# Patient Record
Sex: MALE | Race: WHITE | Employment: OTHER | URBAN - NONMETROPOLITAN AREA
[De-identification: names, ages, dates, MRNs, and addresses within clinical notes are randomized per-mention and may not be internally consistent; named-entity substitution may affect disease eponyms.]

---

## 2016-12-02 LAB
CHOLESTEROL, TOTAL: 140 MG/DL
CHOLESTEROL/HDL RATIO: 3.7
HDLC SERPL-MCNC: 38 MG/DL (ref 35–70)
LDL CHOLESTEROL CALCULATED: 46 MG/DL (ref 0–160)
TRIGL SERPL-MCNC: 281 MG/DL
VLDLC SERPL CALC-MCNC: 56 MG/DL

## 2019-06-05 LAB
BASOPHILS ABSOLUTE: 0 /ΜL
BASOPHILS RELATIVE PERCENT: 0.3 %
EOSINOPHILS ABSOLUTE: 0.1 /ΜL
EOSINOPHILS RELATIVE PERCENT: 1.2 %
HCT VFR BLD CALC: 38 % (ref 41–53)
HEMOGLOBIN: 12.5 G/DL (ref 13.5–17.5)
LYMPHOCYTES ABSOLUTE: 1 /ΜL
LYMPHOCYTES RELATIVE PERCENT: 10.1 %
MCH RBC QN AUTO: 30.1 PG
MCHC RBC AUTO-ENTMCNC: 32.9 G/DL
MCV RBC AUTO: 92 FL
MONOCYTES ABSOLUTE: 0.8 /ΜL
MONOCYTES RELATIVE PERCENT: 7.6 %
NEUTROPHILS ABSOLUTE: 8.1 /ΜL
NEUTROPHILS RELATIVE PERCENT: 80.8 %
PDW BLD-RTO: 13.2 %
PLATELET # BLD: 248 K/ΜL
PMV BLD AUTO: 8 FL
RBC # BLD: 4.15 10^6/ΜL
WBC # BLD: 10.1 10^3/ML

## 2019-08-05 LAB
AVERAGE GLUCOSE: 160
BUN BLDV-MCNC: 22 MG/DL
CALCIUM SERPL-MCNC: 9.8 MG/DL
CHLORIDE BLD-SCNC: 104 MMOL/L
CO2: 26 MMOL/L
CREAT SERPL-MCNC: 0.94 MG/DL
CREATININE URINE POCT: 16.53
GFR CALCULATED: >60
GLUCOSE BLD-MCNC: 133 MG/DL
HBA1C MFR BLD: 7.2 %
MICROALBUMIN/CREAT 24H UR: <0.7 MG/G{CREAT}
MICROALBUMIN/CREAT UR-RTO: <12.1
POTASSIUM SERPL-SCNC: 4.2 MMOL/L
SODIUM BLD-SCNC: 141 MMOL/L

## 2019-09-03 ENCOUNTER — OFFICE VISIT (OUTPATIENT)
Dept: PRIMARY CARE CLINIC | Age: 64
End: 2019-09-03
Payer: COMMERCIAL

## 2019-09-03 VITALS
SYSTOLIC BLOOD PRESSURE: 112 MMHG | RESPIRATION RATE: 16 BRPM | DIASTOLIC BLOOD PRESSURE: 72 MMHG | BODY MASS INDEX: 36.03 KG/M2 | HEIGHT: 72 IN | OXYGEN SATURATION: 97 % | WEIGHT: 266 LBS | HEART RATE: 95 BPM

## 2019-09-03 DIAGNOSIS — Z79.4 TYPE 2 DIABETES MELLITUS WITH DIABETIC POLYNEUROPATHY, WITH LONG-TERM CURRENT USE OF INSULIN (HCC): Primary | ICD-10-CM

## 2019-09-03 DIAGNOSIS — I10 ESSENTIAL HYPERTENSION: ICD-10-CM

## 2019-09-03 DIAGNOSIS — Z23 ENCOUNTER FOR IMMUNIZATION: ICD-10-CM

## 2019-09-03 DIAGNOSIS — E11.42 TYPE 2 DIABETES MELLITUS WITH DIABETIC POLYNEUROPATHY, WITH LONG-TERM CURRENT USE OF INSULIN (HCC): Primary | ICD-10-CM

## 2019-09-03 DIAGNOSIS — G47.33 OBSTRUCTIVE SLEEP APNEA: ICD-10-CM

## 2019-09-03 DIAGNOSIS — R39.14 BENIGN PROSTATIC HYPERPLASIA WITH INCOMPLETE BLADDER EMPTYING: ICD-10-CM

## 2019-09-03 DIAGNOSIS — K21.9 GASTROESOPHAGEAL REFLUX DISEASE WITHOUT ESOPHAGITIS: ICD-10-CM

## 2019-09-03 DIAGNOSIS — N40.1 BENIGN PROSTATIC HYPERPLASIA WITH INCOMPLETE BLADDER EMPTYING: ICD-10-CM

## 2019-09-03 PROCEDURE — 90653 IIV ADJUVANT VACCINE IM: CPT | Performed by: NURSE PRACTITIONER

## 2019-09-03 PROCEDURE — 99204 OFFICE O/P NEW MOD 45 MIN: CPT | Performed by: NURSE PRACTITIONER

## 2019-09-03 PROCEDURE — 90670 PCV13 VACCINE IM: CPT | Performed by: NURSE PRACTITIONER

## 2019-09-03 PROCEDURE — G0008 ADMIN INFLUENZA VIRUS VAC: HCPCS | Performed by: NURSE PRACTITIONER

## 2019-09-03 PROCEDURE — G0009 ADMIN PNEUMOCOCCAL VACCINE: HCPCS | Performed by: NURSE PRACTITIONER

## 2019-09-03 RX ORDER — OMEPRAZOLE 40 MG/1
40 CAPSULE, DELAYED RELEASE ORAL DAILY
COMMUNITY
End: 2020-01-03 | Stop reason: SDUPTHER

## 2019-09-03 RX ORDER — TAMSULOSIN HYDROCHLORIDE 0.4 MG/1
0.4 CAPSULE ORAL DAILY
COMMUNITY
End: 2019-12-24 | Stop reason: SDUPTHER

## 2019-09-03 RX ORDER — HYOSCYAMINE SULFATE 0.125 MG
125 TABLET ORAL EVERY 6 HOURS PRN
COMMUNITY
End: 2019-12-10 | Stop reason: ALTCHOICE

## 2019-09-03 RX ORDER — FLUTICASONE PROPIONATE 50 MCG
1 SPRAY, SUSPENSION (ML) NASAL DAILY
COMMUNITY
End: 2022-02-08

## 2019-09-03 RX ORDER — TADALAFIL 20 MG/1
20 TABLET ORAL PRN
COMMUNITY
End: 2021-10-18

## 2019-09-03 RX ORDER — FINASTERIDE 5 MG/1
5 TABLET, FILM COATED ORAL DAILY
COMMUNITY
End: 2019-12-30 | Stop reason: SDUPTHER

## 2019-09-03 RX ORDER — SIMVASTATIN 20 MG
20 TABLET ORAL NIGHTLY
COMMUNITY
End: 2020-01-03 | Stop reason: SDUPTHER

## 2019-09-03 RX ORDER — LISINOPRIL 20 MG/1
20 TABLET ORAL DAILY
COMMUNITY
End: 2019-10-23 | Stop reason: SDUPTHER

## 2019-09-03 SDOH — HEALTH STABILITY: MENTAL HEALTH: HOW MANY STANDARD DRINKS CONTAINING ALCOHOL DO YOU HAVE ON A TYPICAL DAY?: 1 OR 2

## 2019-09-03 SDOH — HEALTH STABILITY: MENTAL HEALTH: HOW OFTEN DO YOU HAVE A DRINK CONTAINING ALCOHOL?: 2-4 TIMES A MONTH

## 2019-09-03 ASSESSMENT — ENCOUNTER SYMPTOMS
CONSTIPATION: 0
BACK PAIN: 0
DIARRHEA: 0
NAUSEA: 0
SHORTNESS OF BREATH: 0
EYE ITCHING: 0
BLURRED VISION: 0
COUGH: 0
EYE DISCHARGE: 0

## 2019-09-03 ASSESSMENT — PATIENT HEALTH QUESTIONNAIRE - PHQ9
2. FEELING DOWN, DEPRESSED OR HOPELESS: 0
SUM OF ALL RESPONSES TO PHQ QUESTIONS 1-9: 0
SUM OF ALL RESPONSES TO PHQ9 QUESTIONS 1 & 2: 0
1. LITTLE INTEREST OR PLEASURE IN DOING THINGS: 0
SUM OF ALL RESPONSES TO PHQ QUESTIONS 1-9: 0

## 2019-09-03 NOTE — PROGRESS NOTES
[facility]  [address] [place] [id number]  Dept: [phone]    Be Harrington is a 71 y.o. male who presents today as an new patient for his medical conditionsas noted below. Chief Complaint   Patient presents with    New Patient    Diabetes    Hypertension       HPI:     Diabetes   He presents for his follow-up diabetic visit. He has type 2 diabetes mellitus. Pertinent negatives for hypoglycemia include no confusion, dizziness, headaches, hunger or nervousness/anxiousness. Associated symptoms include chest pain (states he has had some treadmill test and they have been OK), fatigue (CPAP helps) and foot paresthesias. Pertinent negatives for diabetes include no blurred vision, no polydipsia, no polyphagia, no polyuria and no weight loss. Symptoms are stable. Risk factors for coronary artery disease include dyslipidemia, diabetes mellitus, male sex, obesity and hypertension. Current diabetic treatment includes intensive insulin program and oral agent (monotherapy). He is compliant with treatment all of the time. His weight is stable (Lost 25 lb after losing wife. Now it has been staying steady). He is following a generally healthy diet. He participates in exercise intermittently (it was daily before he got sick earlier in the year). There is no change in his home blood glucose trend. His breakfast blood glucose range is generally 130-140 mg/dl. His lunch blood glucose range is generally 110-130 mg/dl. His bedtime blood glucose range is generally 110-130 mg/dl. (Checks blood sugars periodically ) An ACE inhibitor/angiotensin II receptor blocker is being taken. Blood pressure when checked at home has always been good but then blood pressure cuff broke.  States it usually runs normal    No concerns - He did bang up right shin on care - abrasion healing    LDL Calculated (mg/dL)   Date Value   12/02/2016 46       (goal LDL Hepatitis C screen  1955    HIV screen  03/14/1969    Shingles Vaccine (1 of 2) 03/14/2004    Annual Wellness Visit (AWV)  03/14/2017    Lipid screen  12/02/2017    Diabetic retinal exam  03/28/2019    A1C test (Diabetic or Prediabetic)  08/05/2020    Diabetic microalbuminuria test  08/05/2020    Potassium monitoring  08/05/2020    Creatinine monitoring  08/05/2020    Diabetic foot exam  09/03/2020    Pneumococcal 65+ years Vaccine (2 of 2 - PPSV23) 09/03/2020    DTaP/Tdap/Td vaccine (2 - Td) 10/08/2027    Colon cancer screen colonoscopy  10/17/2028    Flu vaccine  Completed       Subjective:     Review of Systems   Constitutional: Positive for fatigue (CPAP helps). Negative for weight loss. HENT: Negative for congestion, ear pain and nosebleeds. Eyes: Negative for blurred vision, discharge and itching. Respiratory: Negative for cough and shortness of breath. Cardiovascular: Positive for chest pain (states he has had some treadmill test and they have been OK). Negative for palpitations and leg swelling. Gastrointestinal: Negative for constipation, diarrhea and nausea. Endocrine: Negative for polydipsia, polyphagia and polyuria. Genitourinary: Negative for difficulty urinating and dysuria. Musculoskeletal: Negative for back pain and gait problem. Skin: Negative for rash and wound. Neurological: Negative for dizziness, light-headedness (when cough) and headaches. Psychiatric/Behavioral: Negative for confusion and sleep disturbance. The patient is not nervous/anxious. Objective:     /72   Pulse 95   Resp 16   Ht 6' (1.829 m)   Wt 266 lb (120.7 kg)   SpO2 97%   BMI 36.08 kg/m²   Physical Exam   Constitutional: He is oriented to person, place, and time. He appears well-developed and well-nourished. HENT:   Head: Normocephalic and atraumatic.    Right Ear: External ear normal.   Left Ear: External ear normal.   Eyes: Pupils are equal, round, and reactive to light. EOM are normal.   Neck: Normal range of motion. Neck supple. No thyromegaly present. Cardiovascular: Normal rate and regular rhythm. No murmur heard. Pulses:       Dorsalis pedis pulses are 1+ on the right side, and 0 on the left side. Posterior tibial pulses are 0 on the right side, and 0 on the left side. No carotid bruit   Pulmonary/Chest: Effort normal and breath sounds normal.   Abdominal: Soft. Bowel sounds are normal.   Musculoskeletal: Normal range of motion. He exhibits no edema. Feet:   Right Foot:   Protective Sensation: 10 sites tested. 1 site sensed. Skin Integrity: Negative for skin breakdown. Left Foot:   Protective Sensation: 10 sites tested. 0 sites sensed. Skin Integrity: Negative for skin breakdown. Neurological: He is alert and oriented to person, place, and time. No cranial nerve deficit. Skin: Skin is warm and dry. Capillary refill takes less than 2 seconds. No erythema. Psychiatric: He has a normal mood and affect. His behavior is normal. Thought content normal.   Nursing note and vitals reviewed. Assessment:       Diagnosis Orders   1. Type 2 diabetes mellitus with diabetic polyneuropathy, with long-term current use of insulin (HCC)  [facility] DIABETES FOOT EXAM   2. Essential hypertension     3. Benign prostatic hyperplasia with incomplete bladder emptying     4. Gastroesophageal reflux disease without esophagitis     5. Encounter for immunization  INFLUENZA, TRIV, INACTIVATED, SUBUNIT, ADJUVANTED, 65 YRS AND OLDER, IM, PREFILL SYR, 0.5ML (FLUAD TRIV)    PREVNAR 13 IM (Pneumococcal conjugate vaccine 13-valent)   6. Obstructive sleep apnea          Plan:    Prevnar 13 and High dose flu vaccine today  Review medical records for colonoscopy and lab work      Return in about 3 months (around 12/3/2019) for diabetes.     Orders Placed This Encounter   Procedures    INFLUENZA, TRIV, INACTIVATED, SUBUNIT, ADJUVANTED, 65 YRS AND OLDER, IM, PREFILL SYR, 0.5ML (FLUAD

## 2019-09-09 ENCOUNTER — OFFICE VISIT (OUTPATIENT)
Dept: PRIMARY CARE CLINIC | Age: 64
End: 2019-09-09
Payer: COMMERCIAL

## 2019-09-09 VITALS
OXYGEN SATURATION: 97 % | DIASTOLIC BLOOD PRESSURE: 78 MMHG | SYSTOLIC BLOOD PRESSURE: 124 MMHG | HEART RATE: 118 BPM | TEMPERATURE: 98.3 F

## 2019-09-09 DIAGNOSIS — J02.9 PHARYNGITIS, UNSPECIFIED ETIOLOGY: Primary | ICD-10-CM

## 2019-09-09 PROCEDURE — 99213 OFFICE O/P EST LOW 20 MIN: CPT | Performed by: NURSE PRACTITIONER

## 2019-09-09 ASSESSMENT — ENCOUNTER SYMPTOMS
SORE THROAT: 1
NAUSEA: 0
CONSTIPATION: 0
EYE DISCHARGE: 0
BACK PAIN: 0
TROUBLE SWALLOWING: 1
SHORTNESS OF BREATH: 0
DIARRHEA: 0
EYE ITCHING: 0
COUGH: 0

## 2019-09-18 ENCOUNTER — OFFICE VISIT (OUTPATIENT)
Dept: PRIMARY CARE CLINIC | Age: 64
End: 2019-09-18
Payer: COMMERCIAL

## 2019-09-18 VITALS
HEART RATE: 100 BPM | WEIGHT: 265.9 LBS | TEMPERATURE: 98.5 F | RESPIRATION RATE: 16 BRPM | BODY MASS INDEX: 36.01 KG/M2 | DIASTOLIC BLOOD PRESSURE: 74 MMHG | OXYGEN SATURATION: 98 % | HEIGHT: 72 IN | SYSTOLIC BLOOD PRESSURE: 122 MMHG

## 2019-09-18 DIAGNOSIS — B96.89 ACUTE BACTERIAL PHARYNGITIS: Primary | ICD-10-CM

## 2019-09-18 DIAGNOSIS — J02.8 ACUTE BACTERIAL PHARYNGITIS: Primary | ICD-10-CM

## 2019-09-18 PROCEDURE — 99213 OFFICE O/P EST LOW 20 MIN: CPT | Performed by: NURSE PRACTITIONER

## 2019-09-18 RX ORDER — AMOXICILLIN 500 MG/1
500 CAPSULE ORAL 3 TIMES DAILY
Qty: 30 CAPSULE | Refills: 0 | Status: SHIPPED | OUTPATIENT
Start: 2019-09-18 | End: 2019-12-10 | Stop reason: ALTCHOICE

## 2019-09-18 ASSESSMENT — ENCOUNTER SYMPTOMS
CHANGE IN BOWEL HABIT: 0
NAUSEA: 1
COUGH: 1
EYE ITCHING: 0
BACK PAIN: 0
SHORTNESS OF BREATH: 0
SORE THROAT: 1
ABDOMINAL PAIN: 0
SWOLLEN GLANDS: 0

## 2019-09-18 NOTE — PATIENT INSTRUCTIONS
Patient Education        Sore Throat: Care Instructions  Your Care Instructions    Infection by bacteria or a virus causes most sore throats. Cigarette smoke, dry air, air pollution, allergies, and yelling can also cause a sore throat. Sore throats can be painful and annoying. Fortunately, most sore throats go away on their own. If you have a bacterial infection, your doctor may prescribe antibiotics. Follow-up care is a key part of your treatment and safety. Be sure to make and go to all appointments, and call your doctor if you are having problems. It's also a good idea to know your test results and keep a list of the medicines you take. How can you care for yourself at home? · If your doctor prescribed antibiotics, take them as directed. Do not stop taking them just because you feel better. You need to take the full course of antibiotics. · Gargle with warm salt water once an hour to help reduce swelling and relieve discomfort. Use 1 teaspoon of salt mixed in 1 cup of warm water. · Take an over-the-counter pain medicine, such as acetaminophen (Tylenol), ibuprofen (Advil, Motrin), or naproxen (Aleve). Read and follow all instructions on the label. · Be careful when taking over-the-counter cold or flu medicines and Tylenol at the same time. Many of these medicines have acetaminophen, which is Tylenol. Read the labels to make sure that you are not taking more than the recommended dose. Too much acetaminophen (Tylenol) can be harmful. · Drink plenty of fluids. Fluids may help soothe an irritated throat. Hot fluids, such as tea or soup, may help decrease throat pain. · Use over-the-counter throat lozenges to soothe pain. Regular cough drops or hard candy may also help. These should not be given to young children because of the risk of choking. · Do not smoke or allow others to smoke around you. If you need help quitting, talk to your doctor about stop-smoking programs and medicines.  These can increase your chances of quitting for good. · Use a vaporizer or humidifier to add moisture to your bedroom. Follow the directions for cleaning the machine. When should you call for help? Call your doctor now or seek immediate medical care if:    · You have new or worse trouble swallowing.     · Your sore throat gets much worse on one side.    Watch closely for changes in your health, and be sure to contact your doctor if you do not get better as expected. Where can you learn more? Go to https://Squrl.[employer]. org and sign in to your [employer] account. Enter V737 in the [facility] box to learn more about \"Sore Throat: Care Instructions. \"     If you do not have an account, please click on the \"Sign Up Now\" link. Current as of: October 21, 2018  Content Version: 12.1  © 7499-2851 Healthwise, Incorporated. Care instructions adapted under license by [facility] ([facility]). If you have questions about a medical condition or this instruction, always ask your healthcare professional. [place] [address] any warranty or liability for your use of this information.

## 2019-09-18 NOTE — PROGRESS NOTES
congestion, nosebleeds and postnasal drip. Eyes: Negative for itching. Respiratory: Positive for cough. Negative for shortness of breath. Cardiovascular: Negative for chest pain. Gastrointestinal: Positive for nausea. Negative for abdominal pain and change in bowel habit. Genitourinary: Negative for difficulty urinating and dysuria. Musculoskeletal: Negative for arthralgias and back pain. Skin: Negative for rash and wound. Neurological: Negative for weakness and headaches. Psychiatric/Behavioral: Negative for dysphoric mood and sleep disturbance. The patient is not nervous/anxious. Objective:     /74   Pulse 100   Temp 98.5 °F (36.9 °C)   Resp 16   Ht 6' (1.829 m)   Wt 265 lb 14.4 oz (120.6 kg)   SpO2 98%   BMI 36.06 kg/m²   Physical Exam   Constitutional: He is oriented to person, place, and time. He appears well-developed and well-nourished. HENT:   Head: Normocephalic and atraumatic. Right Ear: Tympanic membrane, external ear and ear canal normal.   Left Ear: Tympanic membrane, external ear and ear canal normal.   Nose: Nose normal. Right sinus exhibits no maxillary sinus tenderness and no frontal sinus tenderness. Left sinus exhibits no maxillary sinus tenderness and no frontal sinus tenderness. Mouth/Throat: Uvula is midline. Posterior oropharyngeal edema and posterior oropharyngeal erythema present. Eyes: Pupils are equal, round, and reactive to light. Conjunctivae and EOM are normal.   Neck: Normal range of motion. Neck supple. No thyromegaly present. Cardiovascular: Normal rate, regular rhythm and normal heart sounds. No murmur heard. Pulmonary/Chest: Effort normal and breath sounds normal.   Abdominal: Soft. Bowel sounds are normal.   Neurological: He is alert and oriented to person, place, and time. No cranial nerve deficit. Skin: Skin is warm and dry. Capillary refill takes less than 2 seconds. Psychiatric: He has a normal mood and affect.  His behavior is normal. Thought content normal.   Nursing note and vitals reviewed. Assessment:       Diagnosis Orders   1. Acute bacterial pharyngitis  amoxicillin (AMOXIL) 500 MG capsule        Plan:    Amoxicillin 500 mg 3x/day  Gargle with salt water PRN    Return if symptoms worsen or fail to improve. No orders of the defined types were placed in this encounter. Orders Placed This Encounter   Medications    amoxicillin (AMOXIL) 500 MG capsule     Sig: Take 1 capsule by mouth 3 times daily     Dispense:  30 capsule     Refill:  0       Patient given educationalmaterials - see patient instructions. Discussed use, benefit, and side effectsof prescribed medications. All patient questions answered. Pt voiced understanding. Reviewed health maintenance. Instructed to continue current medications, diet andexercise. Patient agreed with treatment plan. Follow up as directed.      Electronicallysigned by KADIE Albarran CNP on 9/18/2019 at 11:42 AM

## 2019-10-24 RX ORDER — LISINOPRIL 20 MG/1
20 TABLET ORAL DAILY
Qty: 90 TABLET | Refills: 3 | Status: SHIPPED | OUTPATIENT
Start: 2019-10-24 | End: 2020-11-10 | Stop reason: SDUPTHER

## 2019-11-18 ENCOUNTER — TELEPHONE (OUTPATIENT)
Dept: PRIMARY CARE CLINIC | Age: 64
End: 2019-11-18

## 2019-12-10 ENCOUNTER — OFFICE VISIT (OUTPATIENT)
Dept: PRIMARY CARE CLINIC | Age: 64
End: 2019-12-10
Payer: COMMERCIAL

## 2019-12-10 VITALS
OXYGEN SATURATION: 96 % | SYSTOLIC BLOOD PRESSURE: 116 MMHG | RESPIRATION RATE: 16 BRPM | DIASTOLIC BLOOD PRESSURE: 64 MMHG | WEIGHT: 260.2 LBS | HEART RATE: 111 BPM | BODY MASS INDEX: 35.24 KG/M2 | HEIGHT: 72 IN

## 2019-12-10 DIAGNOSIS — Z11.4 SCREENING FOR HIV WITHOUT PRESENCE OF RISK FACTORS: ICD-10-CM

## 2019-12-10 DIAGNOSIS — Z00.00 ROUTINE GENERAL MEDICAL EXAMINATION AT A HEALTH CARE FACILITY: ICD-10-CM

## 2019-12-10 DIAGNOSIS — E78.5 HYPERLIPIDEMIA, UNSPECIFIED HYPERLIPIDEMIA TYPE: ICD-10-CM

## 2019-12-10 DIAGNOSIS — Z79.4 TYPE 2 DIABETES MELLITUS WITH DIABETIC POLYNEUROPATHY, WITH LONG-TERM CURRENT USE OF INSULIN (HCC): Primary | ICD-10-CM

## 2019-12-10 DIAGNOSIS — Z72.89 OTHER PROBLEMS RELATED TO LIFESTYLE: ICD-10-CM

## 2019-12-10 DIAGNOSIS — Z13.220 SCREENING FOR HYPERLIPIDEMIA: ICD-10-CM

## 2019-12-10 DIAGNOSIS — E11.42 TYPE 2 DIABETES MELLITUS WITH DIABETIC POLYNEUROPATHY, WITH LONG-TERM CURRENT USE OF INSULIN (HCC): Primary | ICD-10-CM

## 2019-12-10 LAB — HBA1C MFR BLD: 7.1 %

## 2019-12-10 PROCEDURE — 83036 HEMOGLOBIN GLYCOSYLATED A1C: CPT | Performed by: NURSE PRACTITIONER

## 2019-12-10 PROCEDURE — G0402 INITIAL PREVENTIVE EXAM: HCPCS | Performed by: NURSE PRACTITIONER

## 2019-12-10 RX ORDER — TORSEMIDE 20 MG/1
20 TABLET ORAL DAILY
COMMUNITY
End: 2019-12-24 | Stop reason: SDUPTHER

## 2019-12-10 RX ORDER — L-METHYLFOLATE-ALGAE-VIT B12-B6 CAP 3-90.314-2-35 MG 3-90.314-2-35 MG
CAP ORAL 2 TIMES DAILY
COMMUNITY

## 2019-12-10 ASSESSMENT — PATIENT HEALTH QUESTIONNAIRE - PHQ9
SUM OF ALL RESPONSES TO PHQ QUESTIONS 1-9: 0
SUM OF ALL RESPONSES TO PHQ QUESTIONS 1-9: 0

## 2019-12-13 LAB
ANTIBODY: NORMAL
CHOLESTEROL, FASTING: 126
HDLC SERPL-MCNC: 37 MG/DL (ref 35–70)
HIV AG/AB: NORMAL
LDL CHOLESTEROL CALCULATED: 52 MG/DL (ref 0–160)
TRIGLYCERIDE, FASTING: 185

## 2019-12-17 DIAGNOSIS — Z11.4 SCREENING FOR HIV WITHOUT PRESENCE OF RISK FACTORS: ICD-10-CM

## 2019-12-17 DIAGNOSIS — Z13.220 SCREENING FOR HYPERLIPIDEMIA: ICD-10-CM

## 2019-12-17 DIAGNOSIS — Z72.89 OTHER PROBLEMS RELATED TO LIFESTYLE: ICD-10-CM

## 2019-12-26 RX ORDER — TAMSULOSIN HYDROCHLORIDE 0.4 MG/1
0.4 CAPSULE ORAL DAILY
Qty: 90 CAPSULE | Refills: 3 | Status: SHIPPED | OUTPATIENT
Start: 2019-12-26 | End: 2021-01-14 | Stop reason: SDUPTHER

## 2019-12-26 RX ORDER — TORSEMIDE 20 MG/1
20 TABLET ORAL DAILY
Qty: 30 TABLET | Refills: 5 | Status: SHIPPED | OUTPATIENT
Start: 2019-12-26 | End: 2020-06-22 | Stop reason: SDUPTHER

## 2019-12-30 RX ORDER — FINASTERIDE 5 MG/1
5 TABLET, FILM COATED ORAL DAILY
Qty: 90 TABLET | Refills: 3 | Status: SHIPPED | OUTPATIENT
Start: 2019-12-30 | End: 2020-01-03 | Stop reason: SDUPTHER

## 2020-01-03 RX ORDER — FINASTERIDE 5 MG/1
5 TABLET, FILM COATED ORAL DAILY
Qty: 90 TABLET | Refills: 3 | Status: SHIPPED | OUTPATIENT
Start: 2020-01-03 | End: 2021-02-08 | Stop reason: SDUPTHER

## 2020-01-03 RX ORDER — SIMVASTATIN 20 MG
20 TABLET ORAL NIGHTLY
Qty: 90 TABLET | Refills: 3 | Status: SHIPPED | OUTPATIENT
Start: 2020-01-03 | End: 2021-01-14 | Stop reason: SDUPTHER

## 2020-01-03 RX ORDER — OMEPRAZOLE 40 MG/1
40 CAPSULE, DELAYED RELEASE ORAL DAILY
Qty: 90 CAPSULE | Refills: 3 | Status: SHIPPED | OUTPATIENT
Start: 2020-01-03 | End: 2021-01-14 | Stop reason: SDUPTHER

## 2020-04-24 ENCOUNTER — TELEPHONE (OUTPATIENT)
Dept: PRIMARY CARE CLINIC | Age: 65
End: 2020-04-24

## 2020-05-11 ENCOUNTER — OFFICE VISIT (OUTPATIENT)
Dept: PRIMARY CARE CLINIC | Age: 65
End: 2020-05-11
Payer: COMMERCIAL

## 2020-05-11 VITALS
HEART RATE: 111 BPM | TEMPERATURE: 97.8 F | WEIGHT: 252.4 LBS | HEIGHT: 72 IN | DIASTOLIC BLOOD PRESSURE: 72 MMHG | OXYGEN SATURATION: 97 % | SYSTOLIC BLOOD PRESSURE: 124 MMHG | BODY MASS INDEX: 34.19 KG/M2

## 2020-05-11 LAB — HBA1C MFR BLD: 6.7 %

## 2020-05-11 PROCEDURE — 99213 OFFICE O/P EST LOW 20 MIN: CPT | Performed by: NURSE PRACTITIONER

## 2020-05-11 PROCEDURE — 83036 HEMOGLOBIN GLYCOSYLATED A1C: CPT | Performed by: NURSE PRACTITIONER

## 2020-05-11 RX ORDER — HYOSCYAMINE SULFATE 0.125 MG
0.12 TABLET ORAL EVERY 4 HOURS PRN
COMMUNITY

## 2020-05-11 RX ORDER — ECHINACEA PURPUREA AERIAL 350 MG
CAPSULE ORAL
COMMUNITY
End: 2021-10-18

## 2020-05-11 RX ORDER — NAPROXEN 250 MG/1
250 TABLET ORAL 2 TIMES DAILY PRN
Qty: 60 TABLET | Refills: 5
Start: 2020-05-11 | End: 2021-06-18 | Stop reason: SDUPTHER

## 2020-05-11 ASSESSMENT — ENCOUNTER SYMPTOMS
EYE PAIN: 0
SINUS PAIN: 0
BLURRED VISION: 0
DIARRHEA: 0
VOMITING: 0
SINUS PRESSURE: 0
COUGH: 0
SHORTNESS OF BREATH: 0
EYE REDNESS: 0
NAUSEA: 0

## 2020-05-11 ASSESSMENT — PATIENT HEALTH QUESTIONNAIRE - PHQ9
1. LITTLE INTEREST OR PLEASURE IN DOING THINGS: 0
2. FEELING DOWN, DEPRESSED OR HOPELESS: 0
SUM OF ALL RESPONSES TO PHQ QUESTIONS 1-9: 0
SUM OF ALL RESPONSES TO PHQ QUESTIONS 1-9: 0
SUM OF ALL RESPONSES TO PHQ9 QUESTIONS 1 & 2: 0

## 2020-05-11 NOTE — PATIENT INSTRUCTIONS
Patient Education        Learning About Meal Planning for Diabetes  Why plan your meals? Meal planning can be a key part of managing diabetes. Planning meals and snacks with the right balance of carbohydrate, protein, and fat can help you keep your blood sugar at the target level you set with your doctor. You don't have to eat special foods. You can eat what your family eats, including sweets once in a while. But you do have to pay attention to how often you eat and how much you eat of certain foods. You may want to work with a dietitian or a certified diabetes educator. He or she can give you tips and meal ideas and can answer your questions about meal planning. This health professional can also help you reach a healthy weight if that is one of your goals. What plan is right for you? Your dietitian or diabetes educator may suggest that you start with the plate format or carbohydrate counting. The plate format  The plate format is a simple way to help you manage how you eat. You plan meals by learning how much space each food should take on a plate. Using the plate format helps you spread carbohydrate throughout the day. It can make it easier to keep your blood sugar level within your target range. It also helps you see if you're eating healthy portion sizes. To use the plate format, you put non-starchy vegetables on half your plate. Add meat or meat substitutes on one-quarter of the plate. Put a grain or starchy vegetable (such as brown rice or a potato) on the final quarter of the plate. You can add a small piece of fruit and some low-fat or fat-free milk or yogurt, depending on your carbohydrate goal for each meal.  Here are some tips for using the plate format:  · Make sure that you are not using an oversized plate. A 9-inch plate is best. Many restaurants use larger plates. · Get used to using the plate format at home. Then you can use it when you eat out.   · Write down your questions about using rapid-acting insulin to take before you eat. If you use an insulin pump, you get a constant rate of insulin during the day. So the pump must be programmed at meals to give you extra insulin to cover the rise in blood sugar after meals. When you know how much carbohydrate you will eat, you can take the right amount of insulin. Or, if you always use the same amount of insulin, you need to make sure that you eat the same amount of carbohydrate at meals. If you need more help to understand carbohydrate counting and food labels, ask your doctor, dietitian, or diabetes educator. How do you get started with meal planning? Here are some tips to get started:  · Plan your meals a week at a time. Don't forget to include snacks too. · Use cookbooks or online recipes to plan several main meals. Plan some quick meals for busy nights. You also can double some recipes that freeze well. Then you can save half for other busy nights when you don't have time to cook. · Make sure you have the ingredients you need for your recipes. If you're running low on basic items, put these items on your shopping list too. · List foods that you use to make breakfasts, lunches, and snacks. List plenty of fruits and vegetables. · Post this list on the refrigerator. Add to it as you think of more things you need. · Take the list to the store to do your weekly shopping. Follow-up care is a key part of your treatment and safety. Be sure to make and go to all appointments, and call your doctor if you are having problems. It's also a good idea to know your test results and keep a list of the medicines you take. Where can you learn more? Go to https://amira."[employer]. org and sign in to your [employer] account. Enter H181 in the [employer] box to learn more about \"Learning About Meal Planning for Diabetes. \"     If you do not have an account, please click on the \"Sign Up Now\" link.   Current as of: December 19, 2019Content Version: 12.4  © 2770-9309 Healthwise, Incorporated. Care instructions adapted under license by [facility] ([facility]). If you have questions about a medical condition or this instruction, always ask your healthcare professional. [address] any warranty or liability for your use of this information.

## 2020-05-11 NOTE — PROGRESS NOTES
[facility]  [address] [place] [id number]  Dept: [facility] 16. is a 76 y.o. male who presents today for his medical conditions/complaintsas noted below. Chief Complaint   Patient presents with    Diabetes       HPI:     Diabetes   He presents for his follow-up diabetic visit. Pertinent negatives for hypoglycemia include no dizziness, headaches or nervousness/anxiousness. Associated symptoms include fatigue (somewhat) and foot paresthesias. Pertinent negatives for diabetes include no blurred vision, no chest pain, no polydipsia, no polyphagia, no polyuria and no weakness. Symptoms are stable. Diabetic complications include peripheral neuropathy. Risk factors for coronary artery disease include diabetes mellitus, dyslipidemia, hypertension, male sex and obesity. Current diabetic treatment includes insulin injections and oral agent (monotherapy). He is compliant with treatment most of the time. He is following a generally healthy diet. He participates in exercise daily. His breakfast blood glucose range is generally 110-130 mg/dl. (Takes blood sugar infrequently  - took a couple times last month) An ACE inhibitor/angiotensin II receptor blocker is being taken. He did weight loss challenge and lost 25-30 lbs. States he got down to 250 lbs. He still walks on treadmill and doing yard work  He fell a couple days ago.  No  Injury   HgA1C = 6.7  LDL Calculated (mg/dL)   Date Value   12/13/2019 52   12/02/2016 46       (goal LDL is <100)   BUN (mg/dL)   Date Value   08/05/2019 22     BP Readings from Last 3 Encounters:   05/11/20 124/72   12/10/19 116/64   09/18/19 122/74          (goal 120/80)    Past Medical History:   Diagnosis Date    GERD (gastroesophageal reflux disease)     Hyperlipidemia     Hypertension     Type 2 diabetes mellitus without complication ([facility] [address]       Past Surgical History:   Procedure Laterality Date    ANTERIOR CRUCIATE LIGAMENT REPAIR      NECK SURGERY      REFRACTIVE SURGERY Bilateral 2013       Family History   Problem Relation Age of Onset    Diabetes Mother     Cancer Father        Social History     Tobacco Use    Smoking status: Never Smoker    Smokeless tobacco: Never Used   Substance Use Topics    Alcohol use: Yes     Frequency: 2-4 times a month     Drinks per session: 1 or 2     Binge frequency: Never      Current Outpatient Medications   Medication Sig Dispense Refill    Echinacea 350 MG CAPS Take by mouth      naproxen (NAPROSYN) 250 MG tablet Take 1 tablet by mouth 2 times daily as needed for Pain 60 tablet 5    finasteride (PROSCAR) 5 MG tablet Take 1 tablet by mouth daily 90 tablet 3    omeprazole (PRILOSEC) 40 MG delayed release capsule Take 1 capsule by mouth daily 90 capsule 3    simvastatin (ZOCOR) 20 MG tablet Take 1 tablet by mouth nightly 90 tablet 3    tamsulosin (FLOMAX) 0.4 MG capsule Take 1 capsule by mouth daily 90 capsule 3    torsemide (DEMADEX) 20 MG tablet Take 1 tablet by mouth daily 30 tablet 5    NOVOFINE 32G X 6 MM MISC USE AS DIRECTED 100 each 3    L-methylfolate-B6-B12 (METANX) 9-56.394-8-27 MG CAPS capsule Take by mouth 2 times daily      Probiotic Product (PROBIOTIC-10) CHEW Take by mouth      Insulin Glargine, 2 Unit Dial, (TOUJEO MAX SOLOSTAR) 300 UNIT/ML SOPN Inject 26 Units into the skin daily 6 pen 3    lisinopril (PRINIVIL;ZESTRIL) 20 MG tablet Take 1 tablet by mouth daily 90 tablet 3    fluticasone (FLONASE) 50 MCG/ACT nasal spray 1 spray by Nasal route daily      tadalafil (CIALIS) 20 MG tablet Take 20 mg by mouth as needed for Erectile Dysfunction      hyoscyamine (LEVSIN) 125 MCG tablet Take 0.125 mg by mouth every 4 hours as needed      metFORMIN (GLUCOPHAGE) 500 MG tablet Take 2 tablets by mouth 2 times daily (with meals) 360 tablet 3     No current facility-administered medications for this visit.       Allergies   Allergen Reactions    m)   Wt 252 lb 6.4 oz (114.5 kg)   SpO2 97%   BMI 34.69 kg/m²   Physical Exam  Vitals signs and nursing note reviewed. Constitutional:       Appearance: Normal appearance. He is obese. HENT:      Head: Normocephalic and atraumatic. Right Ear: Tympanic membrane, ear canal and external ear normal.      Left Ear: Tympanic membrane, ear canal and external ear normal.      Nose: Nose normal.   Eyes:      Extraocular Movements: Extraocular movements intact. Conjunctiva/sclera: Conjunctivae normal.      Pupils: Pupils are equal, round, and reactive to light. Neck:      Musculoskeletal: Normal range of motion and neck supple. Cardiovascular:      Rate and Rhythm: Normal rate and regular rhythm. Pulses:           Dorsalis pedis pulses are 1+ on the right side and 1+ on the left side. Posterior tibial pulses are 0 on the right side and 0 on the left side. Pulmonary:      Effort: Pulmonary effort is normal.      Breath sounds: Normal breath sounds. Abdominal:      General: Bowel sounds are normal.      Tenderness: There is no abdominal tenderness. Musculoskeletal: Normal range of motion. General: No swelling or tenderness. Feet:      Right foot:      Protective Sensation: 10 sites tested. 10 sites sensed. Skin integrity: Callus present. No ulcer. Toenail Condition: Right toenails are long. Left foot:      Protective Sensation: 1 site tested. 10 sites sensed. Skin integrity: Callus present. No ulcer. Toenail Condition: Left toenails are long. Skin:     General: Skin is warm. Capillary Refill: Capillary refill takes less than 2 seconds. Findings: Erythema and rash present. Rash is papular. Comments: Right upper lip papule erythema, hard with mild tenderness   Neurological:      General: No focal deficit present. Mental Status: He is alert and oriented to person, place, and time. Cranial Nerves: No cranial nerve deficit.

## 2020-06-22 RX ORDER — TORSEMIDE 20 MG/1
20 TABLET ORAL DAILY
Qty: 30 TABLET | Refills: 5 | Status: SHIPPED | OUTPATIENT
Start: 2020-06-22 | End: 2020-10-15

## 2020-07-22 ENCOUNTER — TELEPHONE (OUTPATIENT)
Dept: PRIMARY CARE CLINIC | Age: 65
End: 2020-07-22

## 2020-07-24 ENCOUNTER — TELEMEDICINE (OUTPATIENT)
Dept: PRIMARY CARE CLINIC | Age: 65
End: 2020-07-24
Payer: COMMERCIAL

## 2020-07-24 PROCEDURE — 99213 OFFICE O/P EST LOW 20 MIN: CPT | Performed by: NURSE PRACTITIONER

## 2020-07-24 ASSESSMENT — ENCOUNTER SYMPTOMS
COUGH: 0
CONSTIPATION: 0
DIARRHEA: 0
NAUSEA: 0
BACK PAIN: 0
SHORTNESS OF BREATH: 0

## 2020-07-24 NOTE — PROGRESS NOTES
[address] [address]  Dept: [facility] 16. is a 76 y.o. male who presents today for his medical conditions/complaintsas noted below. Chief Complaint   Patient presents with    Other     Pt states while he golfs and bends down for his kiarra, he gets lightheaded and felt like he could pass out. patient also states when he did the weight loss challenge, during some of the exercise he felt dizzy as well. HPI:     HPI     He had an episode dizziness while doing weight loss challenge in March. He lost  33 lbs during weight loss challenge. He was down to 244 lbs. He has had about 4 episode when golfing when he get near the end of round. When he puts  The ball on the tea or bends down he get dizziness. [facility] which is a physical therapy type place. He is trying to get in there for treatment of neuropathy. Son called and states he was exposed to Covid-19 and was patient was exposed. He saw he son about 10 days ago and has no symptoms. Not febrile when went to [facility] [place] is a 76 y.o. male being evaluated by a Virtual Visit (video visit) encounter to address concerns as mentioned above. A caregiver was present when appropriate. Due to this being a TeleHealth encounter (During VXFCF-18 public health emergency), evaluation of the following organ systems was limited: Vitals/Constitutional/EENT/Resp/CV/GI//MS/Neuro/Skin/Heme-Lymph-Imm. Pursuant to the emergency declaration under the [facility], [facility] authority and the [employer] Act, this Virtual Visit was conducted with patient's (and/or legal guardian's) consent, to reduce the patient's risk of exposure to COVID-19 and provide necessary medical care.   The patient (and/or legal guardian) has also been advised to contact this office for worsening conditions or problems, and seek emergency medical treatment and/or call 911 if deemed necessary. Patient identification was verified at the start of the visit: Yes    Total time spent for this encounter: Not billed by time    Services were provided through a video synchronous discussion virtually to substitute for in-person clinic visit. Patient and provider were located at their individual homes. --KADIE Kasper - CNP on 7/24/2020 at 8:55 AM    An electronic signature was used to authenticate this note.   LDL Calculated (mg/dL)   Date Value   12/13/2019 52   12/02/2016 46       (goal LDL is <100)   BUN (mg/dL)   Date Value   08/05/2019 22     BP Readings from Last 3 Encounters:   05/11/20 124/72   12/10/19 116/64   09/18/19 122/74          (goal 120/80)    Past Medical History:   Diagnosis Date    GERD (gastroesophageal reflux disease)     Hyperlipidemia     Hypertension     Type 2 diabetes mellitus without complication ([facility] [address]       Past Surgical History:   Procedure Laterality Date    ANTERIOR CRUCIATE LIGAMENT REPAIR      NECK SURGERY      REFRACTIVE SURGERY Bilateral 2013       Family History   Problem Relation Age of Onset    Diabetes Mother     Cancer Father        Social History     Tobacco Use    Smoking status: Never Smoker    Smokeless tobacco: Never Used   Substance Use Topics    Alcohol use: Yes     Frequency: 2-4 times a month     Drinks per session: 1 or 2     Binge frequency: Never      Current Outpatient Medications   Medication Sig Dispense Refill    torsemide (DEMADEX) 20 MG tablet Take 1 tablet by mouth daily 30 tablet 5    finasteride (PROSCAR) 5 MG tablet Take 1 tablet by mouth daily 90 tablet 3    omeprazole (PRILOSEC) 40 MG delayed release capsule Take 1 capsule by mouth daily 90 capsule 3    simvastatin (ZOCOR) 20 MG tablet Take 1 tablet by mouth nightly 90 tablet 3    tamsulosin (FLOMAX) 0.4 MG capsule Take 1 capsule by mouth daily 90 vaccine  Aged Out       Subjective:      Review of Systems   Constitutional: Negative for chills, fatigue and fever. HENT: Negative for congestion, ear pain and nosebleeds. Respiratory: Negative for cough and shortness of breath. Gastrointestinal: Negative for constipation, diarrhea and nausea. Genitourinary: Negative for difficulty urinating and dysuria. Musculoskeletal: Negative for back pain and gait problem. Skin: Negative for rash and wound. Neurological: Positive for dizziness and light-headedness. Negative for headaches. Psychiatric/Behavioral: Negative for sleep disturbance. The patient is not nervous/anxious. Objective: There were no vitals taken for this visit. Physical Exam  Vitals signs and nursing note reviewed. Constitutional:       Appearance: Normal appearance. HENT:      Head: Normocephalic and atraumatic. Neurological:      Mental Status: He is alert. Psychiatric:         Mood and Affect: Mood normal.         Thought Content: Thought content normal.         Judgment: Judgment normal.         Assessment:       Diagnosis Orders   1. Dizziness     2. Essential hypertension          Plan:    Stay hydrated - increase water intake  Take snacks on golf course to keep glucose regulated. Will orthostatic BPs next time in office. Return if symptoms worsen or fail to improve. No orders of the defined types were placed in this encounter. No orders of the defined types were placed in this encounter. Patient given educationalmaterials - see patient instructions. Discussed use, benefit, and side effectsof prescribed medications. All patient questions answered. Pt voiced understanding. Reviewed health maintenance. Instructed to continue current medications, diet andexercise. Patient agreed with treatment plan. Follow up as directed.      Electronicallysigned by KADIE Nix CNP on 7/24/2020 at 8:55 AM

## 2020-07-28 ENCOUNTER — TELEPHONE (OUTPATIENT)
Dept: PRIMARY CARE CLINIC | Age: 65
End: 2020-07-28

## 2020-07-28 NOTE — TELEPHONE ENCOUNTER
Lazarus Leatherwood with [employer] states that this is the 3rd time she has asked for a form to be filled out and she has already faxed it over 2x now.  She is asking for you to call her back so she can tell you what she needs from you in order for the pt to go out of network to see a peripheral nephrologist.     Please call her [phone]

## 2020-11-10 RX ORDER — LISINOPRIL 20 MG/1
20 TABLET ORAL DAILY
Qty: 90 TABLET | Refills: 3 | Status: SHIPPED | OUTPATIENT
Start: 2020-11-10 | End: 2021-10-05

## 2020-11-18 ENCOUNTER — OFFICE VISIT (OUTPATIENT)
Dept: PRIMARY CARE CLINIC | Age: 65
End: 2020-11-18
Payer: COMMERCIAL

## 2020-11-18 VITALS
DIASTOLIC BLOOD PRESSURE: 72 MMHG | HEART RATE: 106 BPM | WEIGHT: 261 LBS | BODY MASS INDEX: 35.87 KG/M2 | SYSTOLIC BLOOD PRESSURE: 118 MMHG | OXYGEN SATURATION: 97 %

## 2020-11-18 LAB
CREATININE URINE POCT: 50
HBA1C MFR BLD: 6.9 %
MICROALBUMIN/CREAT 24H UR: 10 MG/G{CREAT}
MICROALBUMIN/CREAT UR-RTO: ABNORMAL

## 2020-11-18 PROCEDURE — 90732 PPSV23 VACC 2 YRS+ SUBQ/IM: CPT | Performed by: PHYSICIAN ASSISTANT

## 2020-11-18 PROCEDURE — 82044 UR ALBUMIN SEMIQUANTITATIVE: CPT | Performed by: PHYSICIAN ASSISTANT

## 2020-11-18 PROCEDURE — 83036 HEMOGLOBIN GLYCOSYLATED A1C: CPT | Performed by: PHYSICIAN ASSISTANT

## 2020-11-18 PROCEDURE — 99214 OFFICE O/P EST MOD 30 MIN: CPT | Performed by: PHYSICIAN ASSISTANT

## 2020-11-18 PROCEDURE — G0009 ADMIN PNEUMOCOCCAL VACCINE: HCPCS | Performed by: PHYSICIAN ASSISTANT

## 2020-11-18 RX ORDER — LANCETS 30 GAUGE
1 EACH MISCELLANEOUS 2 TIMES DAILY
Qty: 100 EACH | Refills: 5 | Status: SHIPPED | OUTPATIENT
Start: 2020-11-18

## 2020-11-18 ASSESSMENT — ENCOUNTER SYMPTOMS
COUGH: 0
EYE DISCHARGE: 0
PHOTOPHOBIA: 0
SORE THROAT: 0
SHORTNESS OF BREATH: 0
SINUS PRESSURE: 0
RHINORRHEA: 0
ABDOMINAL DISTENTION: 0
ABDOMINAL PAIN: 0
VOMITING: 0
CHEST TIGHTNESS: 0
CONSTIPATION: 0
DIARRHEA: 0

## 2020-11-18 NOTE — PROGRESS NOTES
Take 2 tablets by mouth 2 times daily (with meals) 360 tablet 3    torsemide (DEMADEX) 20 MG tablet TAKE 1 TABLET BY MOUTH EVERY DAY 90 tablet 0    hyoscyamine (LEVSIN) 125 MCG tablet Take 0.125 mg by mouth every 4 hours as needed      naproxen (NAPROSYN) 250 MG tablet Take 1 tablet by mouth 2 times daily as needed for Pain (Patient taking differently: Take 250 mg by mouth 2 times daily as needed for Pain PRN) 60 tablet 5    finasteride (PROSCAR) 5 MG tablet Take 1 tablet by mouth daily 90 tablet 3    omeprazole (PRILOSEC) 40 MG delayed release capsule Take 1 capsule by mouth daily 90 capsule 3    simvastatin (ZOCOR) 20 MG tablet Take 1 tablet by mouth nightly 90 tablet 3    tamsulosin (FLOMAX) 0.4 MG capsule Take 1 capsule by mouth daily 90 capsule 3    NOVOFINE 32G X 6 MM MISC USE AS DIRECTED 100 each 3    L-methylfolate-B6-B12 (METANX) 9-89.002-7-21 MG CAPS capsule Take by mouth 2 times daily      Probiotic Product (PROBIOTIC-10) CHEW Take by mouth      Insulin Glargine, 2 Unit Dial, (TOUJEO MAX SOLOSTAR) 300 UNIT/ML SOPN Inject 26 Units into the skin daily 6 pen 3    fluticasone (FLONASE) 50 MCG/ACT nasal spray 1 spray by Nasal route daily      tadalafil (CIALIS) 20 MG tablet Take 20 mg by mouth as needed for Erectile Dysfunction      Echinacea 350 MG CAPS Take by mouth       No current facility-administered medications for this visit.       Allergies   Allergen Reactions    Zantac [Ranitidine Hcl] Shortness Of Breath     Chest tightness    Shellfish Allergy Other (See Comments)     Chest tightness, sometimes sever diarhea    Shrimp (Diagnostic) Nausea And Vomiting       Health Maintenance   Topic Date Due    Shingles Vaccine (1 of 2) 03/14/2004    Diabetic microalbuminuria test  08/05/2020    Potassium monitoring  08/05/2020    Creatinine monitoring  08/05/2020    Pneumococcal 65+ years Vaccine (2 of 2 - PPSV23) 09/03/2020    Annual Wellness Visit (AWV)  12/10/2020    Lipid screen 12/13/2020    Diabetic foot exam  05/11/2021    A1C test (Diabetic or Prediabetic)  05/11/2021    Diabetic retinal exam  05/19/2021    DTaP/Tdap/Td vaccine (2 - Td) 10/08/2027    Colon cancer screen colonoscopy  10/17/2028    Flu vaccine  Completed    Hepatitis C screen  Completed    Hepatitis A vaccine  Aged Out    Hib vaccine  Aged Out    Meningococcal (ACWY) vaccine  Aged Out       Subjective:      Review of Systems   Constitutional: Negative for chills, fever and unexpected weight change. HENT: Negative for congestion, hearing loss, rhinorrhea, sinus pressure and sore throat. Eyes: Negative for photophobia, discharge and visual disturbance. Respiratory: Negative for cough, chest tightness and shortness of breath. Cardiovascular: Negative for chest pain, palpitations and leg swelling. Gastrointestinal: Negative for abdominal distention, abdominal pain, constipation, diarrhea and vomiting. Endocrine: Negative for polydipsia and polyuria. Genitourinary: Negative for decreased urine volume, difficulty urinating, frequency and urgency. Musculoskeletal: Negative for arthralgias, gait problem and myalgias. Skin: Negative for rash. Allergic/Immunologic: Negative for food allergies. Neurological: Negative for dizziness, weakness, numbness and headaches. Hematological: Negative for adenopathy. Psychiatric/Behavioral: Negative for dysphoric mood and sleep disturbance. The patient is not nervous/anxious. Objective:     /72   Pulse 106   Wt 261 lb (118.4 kg)   SpO2 97%   BMI 35.87 kg/m²   Physical Exam  Constitutional:       General: He is not in acute distress. Appearance: Normal appearance. He is not ill-appearing. HENT:      Head: Normocephalic and atraumatic.       Right Ear: External ear normal.      Left Ear: External ear normal.      Nose: Nose normal.      Mouth/Throat:      Mouth: Mucous membranes are moist.   Eyes:      Extraocular Movements: Extraocular movements intact. Conjunctiva/sclera: Conjunctivae normal.      Pupils: Pupils are equal, round, and reactive to light. Neck:      Musculoskeletal: Normal range of motion and neck supple. Vascular: No carotid bruit. Cardiovascular:      Rate and Rhythm: Normal rate and regular rhythm. Pulses: Normal pulses. Heart sounds: Normal heart sounds. Pulmonary:      Effort: Pulmonary effort is normal. No respiratory distress. Breath sounds: Normal breath sounds. Abdominal:      General: Bowel sounds are normal. There is no distension. Tenderness: There is no abdominal tenderness. Musculoskeletal: Normal range of motion. Lymphadenopathy:      Cervical: No cervical adenopathy. Skin:     General: Skin is warm and dry. Neurological:      General: No focal deficit present. Mental Status: He is alert and oriented to person, place, and time. Psychiatric:         Mood and Affect: Mood normal.         Behavior: Behavior normal.         Thought Content: Thought content normal.         Assessment:       Diagnosis Orders   1. Type 2 diabetes mellitus with diabetic polyneuropathy, with long-term current use of insulin (HCC)  Lancets MISC    POCT microalbumin    Pneumococcal polysaccharide vaccine 23-valent greater than or equal to 3yo subcutaneous/IM    Basic Metabolic Panel    POCT glycosylated hemoglobin (Hb A1C)   2. Need for vaccination  Pneumococcal polysaccharide vaccine 23-valent greater than or equal to 3yo subcutaneous/IM        Plan:       A1c was 6.9 continue medication as is. Educated to check BS twice daily. Will change metformin to XR if not working. Continue treatment for neuropathy. Return in about 3 months (around 2/18/2021), or AWV.     Orders Placed This Encounter   Procedures    Pneumococcal polysaccharide vaccine 23-valent greater than or equal to 3yo subcutaneous/IM    Basic Metabolic Panel     Standing Status:   Future     Standing Expiration Date:

## 2020-12-03 RX ORDER — INSULIN GLARGINE 300 U/ML
26 INJECTION, SOLUTION SUBCUTANEOUS DAILY
Qty: 6 PEN | Refills: 3 | Status: SHIPPED | OUTPATIENT
Start: 2020-12-03 | End: 2021-07-14 | Stop reason: SDUPTHER

## 2021-01-14 RX ORDER — OMEPRAZOLE 40 MG/1
40 CAPSULE, DELAYED RELEASE ORAL DAILY
Qty: 90 CAPSULE | Refills: 3 | Status: SHIPPED | OUTPATIENT
Start: 2021-01-14 | End: 2022-02-12

## 2021-01-14 RX ORDER — SIMVASTATIN 20 MG
20 TABLET ORAL NIGHTLY
Qty: 90 TABLET | Refills: 3 | Status: SHIPPED | OUTPATIENT
Start: 2021-01-14 | End: 2022-01-12

## 2021-01-14 RX ORDER — TAMSULOSIN HYDROCHLORIDE 0.4 MG/1
0.4 CAPSULE ORAL DAILY
Qty: 90 CAPSULE | Refills: 3 | Status: SHIPPED | OUTPATIENT
Start: 2021-01-14 | End: 2022-01-12

## 2021-01-15 RX ORDER — TORSEMIDE 20 MG/1
TABLET ORAL
Qty: 90 TABLET | Refills: 0 | Status: SHIPPED | OUTPATIENT
Start: 2021-01-15 | End: 2021-04-16

## 2021-02-08 RX ORDER — FINASTERIDE 5 MG/1
TABLET, FILM COATED ORAL
Qty: 90 TABLET | Refills: 3 | Status: SHIPPED | OUTPATIENT
Start: 2021-02-08 | End: 2021-04-27 | Stop reason: SDUPTHER

## 2021-02-08 RX ORDER — FINASTERIDE 5 MG/1
5 TABLET, FILM COATED ORAL DAILY
Qty: 90 TABLET | Refills: 3 | Status: SHIPPED | OUTPATIENT
Start: 2021-02-08 | End: 2021-02-08 | Stop reason: SDUPTHER

## 2021-02-09 ENCOUNTER — OFFICE VISIT (OUTPATIENT)
Dept: PRIMARY CARE CLINIC | Age: 66
End: 2021-02-09
Payer: COMMERCIAL

## 2021-02-09 VITALS
OXYGEN SATURATION: 98 % | SYSTOLIC BLOOD PRESSURE: 128 MMHG | BODY MASS INDEX: 36.59 KG/M2 | HEART RATE: 110 BPM | DIASTOLIC BLOOD PRESSURE: 72 MMHG | WEIGHT: 266.2 LBS

## 2021-02-09 DIAGNOSIS — L08.9 WOUND INFECTION: Primary | ICD-10-CM

## 2021-02-09 DIAGNOSIS — L03.116 CELLULITIS OF LEFT LOWER EXTREMITY: ICD-10-CM

## 2021-02-09 DIAGNOSIS — T14.8XXA WOUND INFECTION: Primary | ICD-10-CM

## 2021-02-09 PROCEDURE — 99213 OFFICE O/P EST LOW 20 MIN: CPT | Performed by: NURSE PRACTITIONER

## 2021-02-09 RX ORDER — SULFAMETHOXAZOLE AND TRIMETHOPRIM 800; 160 MG/1; MG/1
1 TABLET ORAL 2 TIMES DAILY
Qty: 20 TABLET | Refills: 0 | Status: SHIPPED | OUTPATIENT
Start: 2021-02-09 | End: 2021-02-19

## 2021-02-09 SDOH — ECONOMIC STABILITY: INCOME INSECURITY: HOW HARD IS IT FOR YOU TO PAY FOR THE VERY BASICS LIKE FOOD, HOUSING, MEDICAL CARE, AND HEATING?: NOT ASKED

## 2021-02-09 SDOH — ECONOMIC STABILITY: TRANSPORTATION INSECURITY
IN THE PAST 12 MONTHS, HAS LACK OF TRANSPORTATION KEPT YOU FROM MEETINGS, WORK, OR FROM GETTING THINGS NEEDED FOR DAILY LIVING?: NO

## 2021-02-09 SDOH — ECONOMIC STABILITY: FOOD INSECURITY: WITHIN THE PAST 12 MONTHS, THE FOOD YOU BOUGHT JUST DIDN'T LAST AND YOU DIDN'T HAVE MONEY TO GET MORE.: NEVER TRUE

## 2021-02-09 ASSESSMENT — ENCOUNTER SYMPTOMS
DIARRHEA: 0
CONSTIPATION: 0
COUGH: 0
COLOR CHANGE: 1

## 2021-02-09 ASSESSMENT — PATIENT HEALTH QUESTIONNAIRE - PHQ9
SUM OF ALL RESPONSES TO PHQ QUESTIONS 1-9: 0
SUM OF ALL RESPONSES TO PHQ QUESTIONS 1-9: 0

## 2021-02-09 NOTE — PATIENT INSTRUCTIONS
Bactrim DS - 1 tablet 2x/day for 10 days  If not significantly improvement in 7-10 days call office  May leave open to air

## 2021-02-09 NOTE — PROGRESS NOTES
[address] [facility]  [address] [facility] [address]  Dept: [facility] 16. is a 76 y.o. male Established patient, who presents today for his medical conditions/complaintsas noted below. Chief Complaint   Patient presents with    Wound Check     Pt states he cut his leg 2 weeks ago and now its red and swollen        HPI:     HPI    Reviewed prior notes None  Reviewed previous Labs    He was moving a chest in and cut his  Left lateral lower leg. States there was a moderate amount of blood loss. He has had a couple falls and feels off balance at bowling. He bowls every 2 weeks    He was going to [facility] -  for therapy for neuropathy but he has not been back. He is using  a vibrating plate he bought at [employer].    He checks his blood sugar irregularly - generally low to mid 100s. He has been holding off on full dose of metformin.     LDL Calculated (mg/dL)   Date Value   12/13/2019 52   12/02/2016 46       (goal LDL is <100)   BUN (mg/dL)   Date Value   08/05/2019 22     Hemoglobin A1C (%)   Date Value   11/18/2020 6.9     BP Readings from Last 3 Encounters:   02/09/21 128/72   11/18/20 118/72   05/11/20 124/72          (goal 120/80)    Past Medical History:   Diagnosis Date    GERD (gastroesophageal reflux disease)     Hyperlipidemia     Hypertension     Type 2 diabetes mellitus without complication ([facility] [address]       Past Surgical History:   Procedure Laterality Date    ANTERIOR CRUCIATE LIGAMENT REPAIR      NECK SURGERY      REFRACTIVE SURGERY Bilateral 2013       Family History   Problem Relation Age of Onset    Diabetes Mother     Cancer Father        Social History     Tobacco Use    Smoking status: Never Smoker    Smokeless tobacco: Never Used   Substance Use Topics    Alcohol use: Yes     Frequency: 2-4 times a month     Drinks per session: 1 or 2     Binge frequency: Never      Current Outpatient Medications   Medication Sig Dispense Refill    sulfamethoxazole-trimethoprim (BACTRIM DS;SEPTRA DS) 800-160 MG per tablet Take 1 tablet by mouth 2 times daily for 10 days 20 tablet 0    finasteride (PROSCAR) 5 MG tablet TAKE 1 TABLET DAILY 90 tablet 3    NOVOFINE 32G X 6 MM MISC USE AS DIRECTED 100 each 3    torsemide (DEMADEX) 20 MG tablet TAKE 1 TABLET BY MOUTH EVERY DAY 90 tablet 0    simvastatin (ZOCOR) 20 MG tablet Take 1 tablet by mouth nightly 90 tablet 3    tamsulosin (FLOMAX) 0.4 MG capsule Take 1 capsule by mouth daily 90 capsule 3    omeprazole (PRILOSEC) 40 MG delayed release capsule Take 1 capsule by mouth daily 90 capsule 3    Insulin Glargine, 2 Unit Dial, (TOUJEO MAX SOLOSTAR) 300 UNIT/ML SOPN Inject 26 Units into the skin daily 6 pen 3    Lancets MISC 1 each by Does not apply route 2 times daily 100 each 5    lisinopril (PRINIVIL;ZESTRIL) 20 MG tablet Take 1 tablet by mouth daily 90 tablet 3    metFORMIN (GLUCOPHAGE) 500 MG tablet Take 2 tablets by mouth 2 times daily (with meals) 360 tablet 3    Echinacea 350 MG CAPS Take by mouth      hyoscyamine (LEVSIN) 125 MCG tablet Take 0.125 mg by mouth every 4 hours as needed      naproxen (NAPROSYN) 250 MG tablet Take 1 tablet by mouth 2 times daily as needed for Pain (Patient taking differently: Take 250 mg by mouth 2 times daily as needed for Pain PRN) 60 tablet 5    L-methylfolate-B6-B12 (METANX) 9-72.875-7-84 MG CAPS capsule Take by mouth 2 times daily      Probiotic Product (PROBIOTIC-10) CHEW Take by mouth      fluticasone (FLONASE) 50 MCG/ACT nasal spray 1 spray by Nasal route daily      tadalafil (CIALIS) 20 MG tablet Take 20 mg by mouth as needed for Erectile Dysfunction       No current facility-administered medications for this visit.       Allergies   Allergen Reactions    Zantac [Ranitidine Hcl] Shortness Of Breath     Chest tightness    Shellfish Allergy Other (See Comments)     Chest tightness, sometimes sever diarhea    Shrimp (Diagnostic) Nausea And Vomiting       Health Maintenance   Topic Date Due    COVID-19 Vaccine (1 of 2) 03/14/1970    Shingles Vaccine (1 of 2) 03/14/2004    Annual Wellness Visit (AWV)  09/03/2019    Lipid screen  12/13/2020    Diabetic foot exam  05/11/2021    Diabetic retinal exam  05/19/2021    A1C test (Diabetic or Prediabetic)  11/18/2021    Diabetic microalbuminuria test  11/18/2021    Potassium monitoring  11/18/2021    Creatinine monitoring  11/18/2021    DTaP/Tdap/Td vaccine (2 - Td) 10/08/2027    Colon cancer screen colonoscopy  10/17/2028    Flu vaccine  Completed    Pneumococcal 65+ years Vaccine  Completed    Hepatitis C screen  Completed    Hepatitis A vaccine  Aged Out    Hib vaccine  Aged Out    Meningococcal (ACWY) vaccine  Aged Out       Subjective:      Review of Systems   Constitutional: Negative for chills, fatigue and fever. HENT: Negative for congestion and ear pain. Respiratory: Negative for cough. Cardiovascular: Positive for leg swelling. Negative for chest pain and palpitations. Gastrointestinal: Negative for constipation and diarrhea. Genitourinary: Negative for difficulty urinating and dysuria. Musculoskeletal: Negative for gait problem. Skin: Positive for color change and wound. Negative for rash. Hematological: Negative for adenopathy. Does not bruise/bleed easily. Psychiatric/Behavioral: Negative for dysphoric mood and sleep disturbance. The patient is not nervous/anxious. Objective:     /72   Pulse 110   Wt 266 lb 3.2 oz (120.7 kg)   SpO2 98%   BMI 36.59 kg/m²   Physical Exam  Vitals signs and nursing note reviewed. Constitutional:       General: He is not in acute distress. Appearance: He is well-developed. He is not ill-appearing. HENT:      Head: Normocephalic and atraumatic. Right Ear: External ear normal.      Left Ear: External ear normal.   Neck:      Thyroid: No thyromegaly.       Trachea: No tracheal deviation. Cardiovascular:      Rate and Rhythm: Normal rate and regular rhythm. Heart sounds: Normal heart sounds. Pulmonary:      Effort: Pulmonary effort is normal. No respiratory distress. Breath sounds: Normal breath sounds. No wheezing. Abdominal:      General: Bowel sounds are normal.      Palpations: Abdomen is soft. Lymphadenopathy:      Cervical: No cervical adenopathy. Skin:     General: Skin is warm. Findings: Erythema present. No rash. Comments: Left lateral leg wound - 3.5 x 0.5 X depth - wound is covered with scab. No drainage, Erythema 1.0 to 1.5 cm surround wound, tender, minor edema surrounding wound   Neurological:      Mental Status: He is alert and oriented to person, place, and time. Gait: Gait normal.   Psychiatric:         Mood and Affect: Mood normal.         Behavior: Behavior normal.         Thought Content: Thought content normal.         Assessment:       Diagnosis Orders   1. Wound infection  sulfamethoxazole-trimethoprim (BACTRIM DS;SEPTRA DS) 800-160 MG per tablet   2. Cellulitis of left lower extremity  sulfamethoxazole-trimethoprim (BACTRIM DS;SEPTRA DS) 800-160 MG per tablet        Plan:    Bactrim DS - 1 tablet 2x/day for 10 days  If not significantly improvement in 7-10 days call office  May leave open to air    Return in about 2 months (around 4/9/2021) for AWV, diabetes. No orders of the defined types were placed in this encounter. Orders Placed This Encounter   Medications    sulfamethoxazole-trimethoprim (BACTRIM DS;SEPTRA DS) 800-160 MG per tablet     Sig: Take 1 tablet by mouth 2 times daily for 10 days     Dispense:  20 tablet     Refill:  0       Patient given educationalmaterials - see patient instructions. Discussed use, benefit, and side effectsof prescribed medications. All patient questions answered. Pt voiced understanding. Reviewed health maintenance. Instructed to continue current medications, diet andexercise. Patient agreed with treatment plan. Follow up as directed.      Electronicallysigned by KADIE Horner CNP on 2/9/2021 at 2:06 PM

## 2021-04-08 ENCOUNTER — OFFICE VISIT (OUTPATIENT)
Dept: PRIMARY CARE CLINIC | Age: 66
End: 2021-04-08
Payer: COMMERCIAL

## 2021-04-08 VITALS
WEIGHT: 273 LBS | SYSTOLIC BLOOD PRESSURE: 118 MMHG | DIASTOLIC BLOOD PRESSURE: 62 MMHG | BODY MASS INDEX: 37.52 KG/M2 | HEART RATE: 95 BPM | OXYGEN SATURATION: 99 %

## 2021-04-08 DIAGNOSIS — E11.42 TYPE 2 DIABETES MELLITUS WITH DIABETIC POLYNEUROPATHY, WITH LONG-TERM CURRENT USE OF INSULIN (HCC): Primary | ICD-10-CM

## 2021-04-08 DIAGNOSIS — Z79.4 TYPE 2 DIABETES MELLITUS WITH DIABETIC POLYNEUROPATHY, WITH LONG-TERM CURRENT USE OF INSULIN (HCC): Primary | ICD-10-CM

## 2021-04-08 DIAGNOSIS — Z13.220 SCREENING FOR HYPERLIPIDEMIA: ICD-10-CM

## 2021-04-08 DIAGNOSIS — Z00.00 ROUTINE GENERAL MEDICAL EXAMINATION AT A HEALTH CARE FACILITY: ICD-10-CM

## 2021-04-08 LAB
CREATININE URINE POCT: 10
HBA1C MFR BLD: 6.8 %
MICROALBUMIN/CREAT 24H UR: 1 MG/G{CREAT}
MICROALBUMIN/CREAT UR-RTO: <30

## 2021-04-08 PROCEDURE — 83036 HEMOGLOBIN GLYCOSYLATED A1C: CPT | Performed by: NURSE PRACTITIONER

## 2021-04-08 PROCEDURE — 82044 UR ALBUMIN SEMIQUANTITATIVE: CPT | Performed by: NURSE PRACTITIONER

## 2021-04-08 PROCEDURE — G0438 PPPS, INITIAL VISIT: HCPCS | Performed by: NURSE PRACTITIONER

## 2021-04-08 ASSESSMENT — PATIENT HEALTH QUESTIONNAIRE - PHQ9
SUM OF ALL RESPONSES TO PHQ QUESTIONS 1-9: 0
SUM OF ALL RESPONSES TO PHQ QUESTIONS 1-9: 0
1. LITTLE INTEREST OR PLEASURE IN DOING THINGS: 0

## 2021-04-08 NOTE — PROGRESS NOTES
Medicare Annual Wellness Visit  Name: Lizzeth Ray Date: 2021   MRN: [id number] Sex: Male   Age: 78 y.o. Ethnicity: Non-/Non    : 1955 Race: Yeni Skaggs is here for Medicare AWV and Diabetes    Screenings for behavioral, psychosocial and functional/safety risks, and cognitive dysfunction are all negative except as indicated below. These results, as well as other patient data from the [address] form, are documented in Flowsheets linked to this Encounter. Allergies   Allergen Reactions    Zantac [Ranitidine Hcl] Shortness Of Breath     Chest tightness    Shellfish Allergy Other (See Comments)     Chest tightness, sometimes sever diarhea    Shrimp (Diagnostic) Nausea And Vomiting         Prior to Visit Medications    Medication Sig Taking?  Authorizing Provider   finasteride (PROSCAR) 5 MG tablet TAKE 1 TABLET DAILY Yes KADIE Caputo CNP   NOVOFINE 32G X 6 MM MISC USE AS DIRECTED Yes KADIE Caputo CNP   torsemide (DEMADEX) 20 MG tablet TAKE 1 TABLET BY MOUTH EVERY DAY Yes KADIE Caputo CNP   simvastatin (ZOCOR) 20 MG tablet Take 1 tablet by mouth nightly Yes KADIE Caputo CNP   tamsulosin (FLOMAX) 0.4 MG capsule Take 1 capsule by mouth daily Yes KADIE Caputo CNP   omeprazole (PRILOSEC) 40 MG delayed release capsule Take 1 capsule by mouth daily Yes KADIE Caputo CNP   Insulin Glargine, 2 Unit Dial, (TOUJEO MAX SOLOSTAR) 300 UNIT/ML SOPN Inject 26 Units into the skin daily Yes KADIE Caputo CNP   Lancets MISC 1 each by Does not apply route 2 times daily Yes SAVAGE Rangel   lisinopril (PRINIVIL;ZESTRIL) 20 MG tablet Take 1 tablet by mouth daily Yes KADIE Caputo CNP   Echinacea 350 MG CAPS Take by mouth Yes Historical Provider, MD   hyoscyamine (LEVSIN) 125 MCG tablet Take 0.125 mg by mouth every 4 hours as needed Yes Historical Provider, MD   L-methylfolate-B6-B12 Roger James) 3-90.314-2-35 MG CAPS capsule Take by mouth 2 times daily Yes Historical Provider, MD   Probiotic Product (PROBIOTIC-10) CHEW Take by mouth Yes Historical Provider, MD   fluticasone (FLONASE) 50 MCG/ACT nasal spray 1 spray by Nasal route daily Yes Historical Provider, MD   tadalafil (CIALIS) 20 MG tablet Take 20 mg by mouth as needed for Erectile Dysfunction Yes Historical Provider, MD   metFORMIN (GLUCOPHAGE) 500 MG tablet Take 2 tablets by mouth 2 times daily (with meals)  KADIE Barrera CNP   naproxen (NAPROSYN) 250 MG tablet Take 1 tablet by mouth 2 times daily as needed for Pain  Patient not taking: Reported on 4/8/2021  KADIE Barrera CNP         Past Medical History:   Diagnosis Date    GERD (gastroesophageal reflux disease)     Hyperlipidemia     Hypertension     Type 2 diabetes mellitus without complication ([facility] [address]        Past Surgical History:   Procedure Laterality Date    ANTERIOR CRUCIATE LIGAMENT REPAIR      NECK SURGERY      REFRACTIVE SURGERY Bilateral 2013         Family History   Problem Relation Age of Onset    Diabetes Mother     Cancer Father        CareTeam (Including outside providers/suppliers regularly involved in providing care):   Patient Care Team:  KADIE Barrera CNP as PCP - General (Family Nurse Practitioner)  KADIE Barrera CNP as PCP - [facility] Empaneled Provider    Wt Readings from Last 3 Encounters:   04/08/21 273 lb (123.8 kg)   02/09/21 266 lb 3.2 oz (120.7 kg)   11/18/20 261 lb (118.4 kg)     Vitals:    04/08/21 1026   BP: 118/62   Pulse: 95   SpO2: 99%   Weight: 273 lb (123.8 kg)     Body mass index is 37.52 kg/m². Left lower leg wound is improving  - podiatrist has ordered santyl but he has not started it yet    He had some neck pain and lower back pain a couple days after his Covid vaccine - he is going to [facility] and they worked on his neck and back and it is feeling better.  He is going there for the neuropathy    HgA1C = 6.8    Based upon direct observation of the patient, evaluation of cognition reveals recent and remote memory intact. General Appearance: alert and oriented to person, place and time, well-developed and well-nourished, in no acute distress  Skin: warm and dry and left posterior leg wound open with slough in center  Head: normocephalic and atraumatic  Eyes: pupils equal, round, and reactive to light, extraocular eye movements intact, conjunctivae normal  ENT: bilateral tympanic membrane normal, bilateral external ear normal, bilateral ear canal normal, hearing grossly normal bilaterally and sinuses non-tender  Neck: neck supple and non tender without mass   Pulmonary/Chest: clear to auscultation bilaterally- no wheezes, rales or rhonchi, normal air movement, no respiratory distress  Cardiovascular: normal rate, normal S1 and S2, no gallops and no carotid bruits  Abdomen: soft, non-tender, non-distended, normal bowel sounds, no masses or organomegaly  Extremities: no cyanosis, no clubbing, +1-2 + edema-  bilateral stockings not on today and monofilament sensory exam abnormal- left foot 1/10 and right foot one out of ten, pedal pulses not palpable. Toenails thick,   Musculoskeletal: no joint instability  Neurologic: gait and coordination normal and speech normal    Patient's complete Health Risk Assessment and screening values have been reviewed and are found in Flowsheets. The following problems were reviewed today and where indicated follow up appointments were made and/or referrals ordered. Positive Risk Factor Screenings with Interventions:            General Health and ACP:  General  In general, how would you say your health is?: Good  In the past 7 days, have you experienced any of the following?  New or Increased Pain, New or Increased Fatigue, Loneliness, Social Isolation, Stress or Anger?: (!) New or Increased Pain, New or Increased Fatigue(neck and lower back pain -)  Do you get the social and Potassium monitoring  11/18/2021    Creatinine monitoring  11/18/2021    Diabetic foot exam  04/08/2022    A1C test (Diabetic or Prediabetic)  04/08/2022    Diabetic microalbuminuria test  04/08/2022    DTaP/Tdap/Td vaccine (2 - Td) 10/08/2027    Colon cancer screen colonoscopy  10/17/2028    Flu vaccine  Completed    Pneumococcal 65+ years Vaccine  Completed    COVID-19 Vaccine  Completed    Hepatitis C screen  Completed    Hepatitis A vaccine  Aged Out    Hib vaccine  Aged Out    Meningococcal (ACWY) vaccine  Aged Out     Recommendations for [place] Due: see orders and patient instructions/AVS.  . Recommended screening schedule for the next 5-10 years is provided to the patient in written form: see Patient Instructions/AVS.    Rosemarie Ashton was seen today for medicare awv and diabetes. Diagnoses and all orders for this visit:    Type 2 diabetes mellitus with diabetic polyneuropathy, with long-term current use of insulin (HCC)  -     POCT glycosylated hemoglobin (Hb A1C)  -     POCT microalbumin  -     HM DIABETES FOOT EXAM    Screening for hyperlipidemia  -     Lipid, Fasting; Future    Routine general medical examination at a health care facility                   Advance Care Planning   Advanced Care Planning: Discussed the patients choices for care and treatment in case of a health event that adversely affects decision-making abilities. Also discussed the patients long-term treatment options. Reviewed with the patient the [address] [place] [address] of [address] [place] [address] Declaration forms  Reviewed the process of designating a competent adult as an Agent (or -in-fact) that could take make health care decisions for the patient if incompetent. Patient was asked to complete the declaration forms, either acknowledge the forms by a public notary or an eligible witness and provide a signed copy to the practice office.   Time spent (minutes): 5     Obesity Counseling: Assessed behavioral health risks and factors affecting choice of behavior. Suggested weight control approaches, including dietary changes behavioral modification and follow up plan. Provided educational and support documentation.   Time spent (minutes): 5

## 2021-04-08 NOTE — PATIENT INSTRUCTIONS
Eating Healthy Foods: Care Instructions  Your Care Instructions     Eating healthy foods can help lower your risk for disease. Healthy food gives you energy and keeps your heart strong, your brain active, your muscles working, and your bones strong. A healthy diet includes a variety of foods from the basic food groups: grains, vegetables, fruits, milk and milk products, and meat and beans. Some people may eat more of their favorite foods from only one food group and, as a result, miss getting the nutrients they need. So, it is important to pay attention not only to what you eat but also to what you are missing from your diet. You can eat a healthy, balanced diet by making a few small changes. Follow-up care is a key part of your treatment and safety. Be sure to make and go to all appointments, and call your doctor if you are having problems. It's also a good idea to know your test results and keep a list of the medicines you take. How can you care for yourself at home? Look at what you eat  · Keep a food diary for a week or two and record everything you eat or drink. Track the number of servings you eat from each food group. · For a balanced diet every day, eat a variety of:  ? 6 or more ounce-equivalents of grains, such as cereals, breads, crackers, rice, or pasta, every day. An ounce-equivalent is 1 slice of bread, 1 cup of ready-to-eat cereal, or ½ cup of cooked rice, cooked pasta, or cooked cereal.  ? 2½ cups of vegetables, especially:  § Dark-green vegetables such as broccoli and spinach. § Orange vegetables such as carrots and sweet potatoes. § Dry beans (such as wright and kidney beans) and peas (such as lentils). ? 2 cups of fresh, frozen, or canned fruit. A small apple or 1 banana or orange equals 1 cup. ? 3 cups of nonfat or low-fat milk, yogurt, or other milk products. ? 5½ ounces of meat and beans, such as chicken, fish, lean meat, beans, nuts, and seeds.  One egg, 1 tablespoon of peanut butter, ½ ounce nuts or seeds, or ¼ cup of cooked beans equals 1 ounce of meat. · Learn how to read food labels for serving sizes and ingredients. Fast-food and convenience-food meals often contain few or no fruits or vegetables. Make sure you eat some fruits and vegetables to make the meal more nutritious. · Look at your food diary. For each food group, add up what you have eaten and then divide the total by the number of days. This will give you an idea of how much you are eating from each food group. See if you can find some ways to change your diet to make it more healthy. Start small  · Do not try to make dramatic changes to your diet all at once. You might feel that you are missing out on your favorite foods and then be more likely to fail. · Start slowly, and gradually change your habits. Try some of the following:  ? Use whole wheat bread instead of white bread. ? Use nonfat or low-fat milk instead of whole milk. ? Eat brown rice instead of white rice, and eat whole wheat pasta instead of white-flour pasta. ? Try low-fat cheeses and low-fat yogurt. ? Add more fruits and vegetables to meals and have them for snacks. ? Add lettuce, tomato, cucumber, and onion to sandwiches. ? Add fruit to yogurt and cereal.  Enjoy food  · You can still eat your favorite foods. You just may need to eat less of them. If your favorite foods are high in fat, salt, and sugar, limit how often you eat them, but do not cut them out entirely. · Eat a wide variety of foods. Make healthy choices when eating out  · The type of restaurant you choose can help you make healthy choices. Even fast-food chains are now offering more low-fat or healthier choices on the menu. · Choose smaller portions, or take half of your meal home. · When eating out, try:  ? A veggie pizza with a whole wheat crust or grilled chicken (instead of sausage or pepperoni).   ? Pasta with roasted vegetables, grilled chicken, or marinara sauce instead of cream sauce. ? A vegetable wrap or grilled chicken wrap. ? Broiled or poached food instead of fried or breaded items. Make healthy choices easy  · Buy packaged, prewashed, ready-to-eat fresh vegetables and fruits, such as baby carrots, salad mixes, and chopped or shredded broccoli and cauliflower. · Buy packaged, presliced fruits, such as melon or pineapple. · Choose 100% fruit or vegetable juice instead of soda. Limit juice intake to 4 to 6 oz (½ to ¾ cup) a day. · Blend low-fat yogurt, fruit juice, and canned or frozen fruit to make a smoothie for breakfast or a snack. Where can you learn more? Go to https://[employer]. org and sign in to your [employer] account. Enter K665 in the [employer] box to learn more about \"Eating Healthy Foods: Care Instructions. \"     If you do not have an account, please click on the \"Sign Up Now\" link. Current as of: December 17, 2020               Content Version: 12.8  © 8238-4835 [employer]. Care instructions adapted under license by [facility] ([facility]). If you have questions about a medical condition or this instruction, always ask your healthcare professional. [address] any warranty or liability for your use of this information. Personalized Preventive Plan for Benito Crum - 4/8/2021  Medicare offers a range of preventive health benefits. Some of the tests and screenings are paid in full while other may be subject to a deductible, co-insurance, and/or copay. Some of these benefits include a comprehensive review of your medical history including lifestyle, illnesses that may run in your family, and various assessments and screenings as appropriate. After reviewing your medical record and screening and assessments performed today your provider may have ordered immunizations, labs, imaging, and/or referrals for you.   A list of these orders (if applicable) as well as your Preventive Care list are

## 2021-04-16 RX ORDER — TORSEMIDE 20 MG/1
TABLET ORAL
Qty: 90 TABLET | Refills: 0 | Status: SHIPPED | OUTPATIENT
Start: 2021-04-16 | End: 2021-04-29 | Stop reason: SDUPTHER

## 2021-04-27 RX ORDER — FINASTERIDE 5 MG/1
TABLET, FILM COATED ORAL
Qty: 90 TABLET | Refills: 3 | Status: SHIPPED | OUTPATIENT
Start: 2021-04-27 | End: 2022-05-09 | Stop reason: SDUPTHER

## 2021-04-29 RX ORDER — TORSEMIDE 20 MG/1
TABLET ORAL
Qty: 90 TABLET | Refills: 2 | Status: SHIPPED | OUTPATIENT
Start: 2021-04-29 | End: 2021-08-09 | Stop reason: SDUPTHER

## 2021-05-13 ENCOUNTER — TELEPHONE (OUTPATIENT)
Dept: PRIMARY CARE CLINIC | Age: 66
End: 2021-05-13

## 2021-05-13 ENCOUNTER — OFFICE VISIT (OUTPATIENT)
Dept: PRIMARY CARE CLINIC | Age: 66
End: 2021-05-13
Payer: COMMERCIAL

## 2021-05-13 VITALS
DIASTOLIC BLOOD PRESSURE: 60 MMHG | HEART RATE: 111 BPM | WEIGHT: 269.8 LBS | OXYGEN SATURATION: 96 % | TEMPERATURE: 98.1 F | BODY MASS INDEX: 36.54 KG/M2 | SYSTOLIC BLOOD PRESSURE: 106 MMHG | HEIGHT: 72 IN

## 2021-05-13 DIAGNOSIS — M53.3 PAIN OF RIGHT SACROILIAC JOINT: ICD-10-CM

## 2021-05-13 DIAGNOSIS — M25.551 RIGHT HIP PAIN: Primary | ICD-10-CM

## 2021-05-13 PROCEDURE — 99213 OFFICE O/P EST LOW 20 MIN: CPT | Performed by: PHYSICIAN ASSISTANT

## 2021-05-13 RX ORDER — PREDNISONE 20 MG/1
40 TABLET ORAL DAILY
Qty: 10 TABLET | Refills: 0 | Status: SHIPPED | OUTPATIENT
Start: 2021-05-13 | End: 2021-05-18

## 2021-05-13 ASSESSMENT — PATIENT HEALTH QUESTIONNAIRE - PHQ9
SUM OF ALL RESPONSES TO PHQ QUESTIONS 1-9: 0
2. FEELING DOWN, DEPRESSED OR HOPELESS: 0
SUM OF ALL RESPONSES TO PHQ QUESTIONS 1-9: 0
1. LITTLE INTEREST OR PLEASURE IN DOING THINGS: 0
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
## 2021-05-13 NOTE — TELEPHONE ENCOUNTER
Pt called in to get advice on if an appt is needed or referral to specialist. For approx 1 week Pt has been having RT hip pain than will radiate if moving a certain way. Pt states the pain is more of a soreness but will become stabbing if moves a certain way. Pt would like to know if he should come in for an appt or something else. If an appt Pt prefers [email or web address] but either is okay. Please follow up.

## 2021-05-18 DIAGNOSIS — M25.551 RIGHT HIP PAIN: ICD-10-CM

## 2021-06-18 DIAGNOSIS — M25.551 RIGHT HIP PAIN: Primary | ICD-10-CM

## 2021-06-18 RX ORDER — NAPROXEN 250 MG/1
250 TABLET ORAL 2 TIMES DAILY PRN
Qty: 60 TABLET | Refills: 5 | Status: SHIPPED | OUTPATIENT
Start: 2021-06-18 | End: 2021-10-18

## 2021-06-18 NOTE — PROGRESS NOTES
Pt called in, states ortho needs referral placed.  He was given the number to [facility] earlier, I placed the referral.

## 2021-06-18 NOTE — TELEPHONE ENCOUNTER
Patient is going to podiatry for wounds on his foot on Friday. States that he has been doing PT but it is water therapy and the wounds have prevented him from doing PT recently. Patient asking what next steps are for his hip pain.

## 2021-06-21 DIAGNOSIS — M25.551 HIP PAIN, RIGHT: Primary | ICD-10-CM

## 2021-06-24 ENCOUNTER — TELEPHONE (OUTPATIENT)
Dept: ORTHOPEDIC SURGERY | Age: 66
End: 2021-06-24

## 2021-06-24 ENCOUNTER — OFFICE VISIT (OUTPATIENT)
Dept: ORTHOPEDIC SURGERY | Age: 66
End: 2021-06-24
Payer: COMMERCIAL

## 2021-06-24 VITALS — WEIGHT: 269 LBS | RESPIRATION RATE: 12 BRPM | HEIGHT: 71 IN | BODY MASS INDEX: 37.66 KG/M2

## 2021-06-24 DIAGNOSIS — M16.10 ARTHRITIS OF HIP: ICD-10-CM

## 2021-06-24 DIAGNOSIS — M25.559 HIP PAIN: Primary | ICD-10-CM

## 2021-06-24 DIAGNOSIS — M47.816 LUMBAR FACET ARTHROPATHY: ICD-10-CM

## 2021-06-24 PROCEDURE — 99203 OFFICE O/P NEW LOW 30 MIN: CPT | Performed by: ORTHOPAEDIC SURGERY

## 2021-06-24 NOTE — TELEPHONE ENCOUNTER
Patient unable to see Dr. Jacinto Ochoa due to insurance. Do you have another recommendation of a physician that is with [facility] (patient on [address]). This was an error  It was his IR injection that patient is unable to go to [facility] ([facility] sites.

## 2021-06-24 NOTE — PROGRESS NOTES
Patient ID: Marilin Hall is a 78 y.o. male    Chief Compliant:  No chief complaint on file. HPI:  This is a 78 y.o. male who presents to the clinic today for right hip evaluation. Patient notes right lateral hip pain and groin pain with motion that does not inhibit his ADLs    He has a wound dressing to his left distal lower extremity    He notes that standing in one position is painful, but it is not painful to walk long distances    Patient initially complains more of lateral hip pain however as the exam progressed patient has lateral buttock and groin pain    Review of Systems   All other systems reviewed and are negative.       Past History:    Current Outpatient Medications:     naproxen (NAPROSYN) 250 MG tablet, Take 1 tablet by mouth 2 times daily as needed for Pain, Disp: 60 tablet, Rfl: 5    torsemide (DEMADEX) 20 MG tablet, TAKE 1 TABLET BY MOUTH EVERY DAY, Disp: 90 tablet, Rfl: 2    Insulin Pen Needle (NOVOFINE) 32G X 6 MM MISC, USE AS DIRECTED, Disp: 100 each, Rfl: 3    finasteride (PROSCAR) 5 MG tablet, TAKE 1 TABLET DAILY, Disp: 90 tablet, Rfl: 3    simvastatin (ZOCOR) 20 MG tablet, Take 1 tablet by mouth nightly, Disp: 90 tablet, Rfl: 3    tamsulosin (FLOMAX) 0.4 MG capsule, Take 1 capsule by mouth daily, Disp: 90 capsule, Rfl: 3    omeprazole (PRILOSEC) 40 MG delayed release capsule, Take 1 capsule by mouth daily, Disp: 90 capsule, Rfl: 3    Insulin Glargine, 2 Unit Dial, (TOUJEO MAX SOLOSTAR) 300 UNIT/ML SOPN, Inject 26 Units into the skin daily, Disp: 6 pen, Rfl: 3    Lancets MISC, 1 each by Does not apply route 2 times daily, Disp: 100 each, Rfl: 5    lisinopril (PRINIVIL;ZESTRIL) 20 MG tablet, Take 1 tablet by mouth daily, Disp: 90 tablet, Rfl: 3    metFORMIN (GLUCOPHAGE) 500 MG tablet, Take 2 tablets by mouth 2 times daily (with meals), Disp: 360 tablet, Rfl: 3    Echinacea 350 MG CAPS, Take by mouth, Disp: , Rfl:     hyoscyamine (LEVSIN) 125 MCG tablet, Take 0.125 mg by or Organizations:     Attends Club or Organization Meetings:     Marital Status:    Intimate Partner Violence:     Fear of Current or Ex-Partner:     Emotionally Abused:     Physically Abused:     Sexually Abused:      Past Medical History:   Diagnosis Date    GERD (gastroesophageal reflux disease)     Hyperlipidemia     Hypertension     Type 2 diabetes mellitus without complication ([address]      Past Surgical History:   Procedure Laterality Date    ANTERIOR CRUCIATE LIGAMENT REPAIR      NECK SURGERY      REFRACTIVE SURGERY Bilateral 2013     Family History   Problem Relation Age of Onset    Diabetes Mother     Cancer Father         Physical Exam:  Vitals signs and nursing note reviewed. Constitutional:       Appearance: She is well-developed. HENT:      Head: Normocephalic and atraumatic. Nose: Nose normal.   Eyes:      Conjunctiva/sclera: Conjunctivae normal.   Neck:      Musculoskeletal: Normal range of motion and neck supple. Pulmonary:      Effort: Pulmonary effort is normal. No respiratory distress. Musculoskeletal:      Comments: Normal gait     Skin:     General: Skin is warm and dry. Neurological:      Mental Status: She is alert and oriented to person, place, and time. Sensory: No sensory deficit. Psychiatric:         Behavior: Behavior normal.         Thought Content:  Thought content normal.    Patient with groin pain with abduction of the hip 20 degrees internal rotation and external rotation which are roughly half of normal also hip pain with adduction    Diagnostic imaging:    AP lateral lumbar spine tremendous facet arthropathy some moderate anterior disc calcification otherwise normal.  Hips on AP lumbar spine demonstrate endplate sclerosis marginal osteophytes with right greater than left hip joint space narrowing      Outside films actually show less arthritis than our spine films but largely similar findings with significant endplate sclerosis and marginal osteophytes joint space narrowing looks less pronounced on hip films    Assessment and Plan:  1. Hip pain    2. Arthritis of hip    3. Lumbar facet arthropathy      IR right hip injection     Follow up after injection    Provider Attestation:  Cornelia Moralez, personally performed the services described in this documentation. All medical record entries made by the scribe were at my direction and in my presence. I have reviewed the chart and discharge instructions and agree that the records reflect my personal performance and is accurate and complete. Ruth Ann Lim MD 6/24/21     Scribe Attestation:  By signing my name below, Lillianbhargav Dallas, attest that this documentation has been prepared under the direction and in the presence of Dr. Elver Aguirre. Electronically signed: Archana Titus, 6/24/21     Please note that this chart was generated using voice recognition Dragon dictation software. Although every effort was made to ensure the accuracy of this automated transcription, some errors in transcription may have occurred.

## 2021-07-01 ENCOUNTER — OFFICE VISIT (OUTPATIENT)
Dept: ORTHOPEDIC SURGERY | Age: 66
End: 2021-07-01
Payer: COMMERCIAL

## 2021-07-01 ENCOUNTER — TELEPHONE (OUTPATIENT)
Dept: ORTHOPEDIC SURGERY | Age: 66
End: 2021-07-01

## 2021-07-01 VITALS — HEIGHT: 71 IN | RESPIRATION RATE: 18 BRPM | WEIGHT: 269 LBS | BODY MASS INDEX: 37.66 KG/M2

## 2021-07-01 DIAGNOSIS — M16.10 ARTHRITIS OF HIP: ICD-10-CM

## 2021-07-01 DIAGNOSIS — M25.559 HIP PAIN: Primary | ICD-10-CM

## 2021-07-01 PROCEDURE — 99213 OFFICE O/P EST LOW 20 MIN: CPT | Performed by: ORTHOPAEDIC SURGERY

## 2021-07-01 NOTE — PROGRESS NOTES
Patient ID: Ava Client is a 78 y.o. male    Chief Compliant:  No chief complaint on file. HPI:  This is a 78 y.o. male who presents to the clinic today for right hip pain. Patient notes that his pain has improved following right hip injections with IR. Review of Systems   All other systems reviewed and are negative.       Past History:    Current Outpatient Medications:     naproxen (NAPROSYN) 250 MG tablet, Take 1 tablet by mouth 2 times daily as needed for Pain, Disp: 60 tablet, Rfl: 5    torsemide (DEMADEX) 20 MG tablet, TAKE 1 TABLET BY MOUTH EVERY DAY, Disp: 90 tablet, Rfl: 2    Insulin Pen Needle (NOVOFINE) 32G X 6 MM MISC, USE AS DIRECTED, Disp: 100 each, Rfl: 3    finasteride (PROSCAR) 5 MG tablet, TAKE 1 TABLET DAILY, Disp: 90 tablet, Rfl: 3    simvastatin (ZOCOR) 20 MG tablet, Take 1 tablet by mouth nightly, Disp: 90 tablet, Rfl: 3    tamsulosin (FLOMAX) 0.4 MG capsule, Take 1 capsule by mouth daily, Disp: 90 capsule, Rfl: 3    omeprazole (PRILOSEC) 40 MG delayed release capsule, Take 1 capsule by mouth daily, Disp: 90 capsule, Rfl: 3    Insulin Glargine, 2 Unit Dial, (TOUJEO MAX SOLOSTAR) 300 UNIT/ML SOPN, Inject 26 Units into the skin daily, Disp: 6 pen, Rfl: 3    Lancets MISC, 1 each by Does not apply route 2 times daily, Disp: 100 each, Rfl: 5    lisinopril (PRINIVIL;ZESTRIL) 20 MG tablet, Take 1 tablet by mouth daily, Disp: 90 tablet, Rfl: 3    metFORMIN (GLUCOPHAGE) 500 MG tablet, Take 2 tablets by mouth 2 times daily (with meals), Disp: 360 tablet, Rfl: 3    Echinacea 350 MG CAPS, Take by mouth, Disp: , Rfl:     hyoscyamine (LEVSIN) 125 MCG tablet, Take 0.125 mg by mouth every 4 hours as needed, Disp: , Rfl:     L-methylfolate-B6-B12 (METANX) 5-51.636-7-34 MG CAPS capsule, Take by mouth 2 times daily, Disp: , Rfl:     Probiotic Product (PROBIOTIC-10) CHEW, Take by mouth, Disp: , Rfl:     fluticasone (FLONASE) 50 MCG/ACT nasal spray, 1 spray by Nasal route daily, Disp: , Rfl:     tadalafil (CIALIS) 20 MG tablet, Take 20 mg by mouth as needed for Erectile Dysfunction, Disp: , Rfl:   Allergies   Allergen Reactions    Zantac [Ranitidine Hcl] Shortness Of Breath     Chest tightness    Shellfish Allergy Other (See Comments)     Chest tightness, sometimes sever diarhea    Shrimp (Diagnostic) Nausea And Vomiting     Social History     Socioeconomic History    Marital status:      Spouse name: Not on file    Number of children: Not on file    Years of education: Not on file    Highest education level: Not on file   Occupational History    Not on file   Tobacco Use    Smoking status: Never Smoker    Smokeless tobacco: Never Used   Vaping Use    Vaping Use: Never used   Substance and Sexual Activity    Alcohol use: Yes    Drug use: Never    Sexual activity: Not on file   Other Topics Concern    Not on file   Social History Narrative    Not on file     Social Determinants of Health     Financial Resource Strain:     Difficulty of Paying Living Expenses:    Food Insecurity: No Food Insecurity    Worried About Running Out of Food in the Last Year: Never true    Oksana of Food in the Last Year: Never true   Transportation Needs: No Transportation Needs    Lack of Transportation (Medical): No    Lack of Transportation (Non-Medical):  No   Physical Activity:     Days of Exercise per Week:     Minutes of Exercise per Session:    Stress:     Feeling of Stress :    Social Connections:     Frequency of Communication with Friends and Family:     Frequency of Social Gatherings with Friends and Family:     Attends Buddhist Services:     Active Member of Clubs or Organizations:     Attends Club or Organization Meetings:     Marital Status:    Intimate Partner Violence:     Fear of Current or Ex-Partner:     Emotionally Abused:     Physically Abused:     Sexually Abused:      Past Medical History:   Diagnosis Date    GERD (gastroesophageal reflux disease)     Hyperlipidemia     Hypertension     Type 2 diabetes mellitus without complication ([facility] [address]      Past Surgical History:   Procedure Laterality Date    ANTERIOR CRUCIATE LIGAMENT REPAIR      NECK SURGERY      REFRACTIVE SURGERY Bilateral 2013     Family History   Problem Relation Age of Onset    Diabetes Mother     Cancer Father         Physical Exam:  Vitals signs and nursing note reviewed. Constitutional:       Appearance: She is well-developed. HENT:      Head: Normocephalic and atraumatic. Nose: Nose normal.   Eyes:      Conjunctiva/sclera: Conjunctivae normal.   Neck:      Musculoskeletal: Normal range of motion and neck supple. Pulmonary:      Effort: Pulmonary effort is normal. No respiratory distress. Musculoskeletal:      Comments: Normal gait     Skin:     General: Skin is warm and dry. Neurological:      Mental Status: She is alert and oriented to person, place, and time. Sensory: No sensory deficit. Psychiatric:         Behavior: Behavior normal.         Thought Content: Thought content normal.    Physical exam abduction continues to be limited at 20 degrees internal rotation is largely limited to neutral today's exam however demonstrates no pain with this range of motion whereas last time he was getting up substantial amount of discomfort    Diagnostic imaging:        Assessment and Plan:  1. Hip pain    2. Arthritis of hip        Excellent relief with IR right hip injection    Waiting for left foot wound to heal before PT    Follow up in 6 weeks     Provider Attestation:  Jeff Moralez, personally performed the services described in this documentation. All medical record entries made by the scribe were at my direction and in my presence. I have reviewed the chart and discharge instructions and agree that the records reflect my personal performance and is accurate and complete. Francisca Hahn MD 7/1/21     Scribe Attestation:  By signing my name below, Yeny Avila attest that this documentation has been prepared under the direction and in the presence of Dr. Gail Gunn. Electronically signed: Archana Garzon, 7/1/21     Please note that this chart was generated using voice recognition Dragon dictation software. Although every effort was made to ensure the accuracy of this automated transcription, some errors in transcription may have occurred.

## 2021-07-01 NOTE — TELEPHONE ENCOUNTER
Jodi from [facility] called to request a phone call as soon as possible from our office to clarify an order that was faxed over for the patient.   She needs someone to call her really quickly because the patient is scheduled to come in the office today at 1pm.  Please call Jodi at: [phone]

## 2021-07-01 NOTE — TELEPHONE ENCOUNTER
I called Jodi back and she wanted to know if the IR injection is for the Lumbar (that's what the order stated) or for the right hip. Per Dr. Elisha Blake progress note it is for right hip, not the lumbar spine. I went into encounter and cancelled the Lumbar order and put in corrected order for Right Hip injection and faxed over to [facility] at [phone].

## 2021-07-14 ENCOUNTER — TELEPHONE (OUTPATIENT)
Dept: PRIMARY CARE CLINIC | Age: 66
End: 2021-07-14

## 2021-07-14 RX ORDER — INSULIN GLARGINE 300 U/ML
26 INJECTION, SOLUTION SUBCUTANEOUS DAILY
Qty: 6 PEN | Refills: 3 | Status: SHIPPED | OUTPATIENT
Start: 2021-07-14 | End: 2021-10-18 | Stop reason: SDUPTHER

## 2021-08-09 RX ORDER — TORSEMIDE 20 MG/1
TABLET ORAL
Qty: 90 TABLET | Refills: 2 | Status: SHIPPED | OUTPATIENT
Start: 2021-08-09 | End: 2021-10-18 | Stop reason: SDUPTHER

## 2021-10-05 RX ORDER — LISINOPRIL 20 MG/1
TABLET ORAL
Qty: 90 TABLET | Refills: 3 | Status: SHIPPED | OUTPATIENT
Start: 2021-10-05 | End: 2022-01-11 | Stop reason: SINTOL

## 2021-10-18 ENCOUNTER — OFFICE VISIT (OUTPATIENT)
Dept: PRIMARY CARE CLINIC | Age: 66
End: 2021-10-18
Payer: COMMERCIAL

## 2021-10-18 VITALS
DIASTOLIC BLOOD PRESSURE: 64 MMHG | OXYGEN SATURATION: 98 % | BODY MASS INDEX: 38.08 KG/M2 | HEART RATE: 113 BPM | WEIGHT: 273 LBS | SYSTOLIC BLOOD PRESSURE: 118 MMHG

## 2021-10-18 DIAGNOSIS — Z79.4 TYPE 2 DIABETES MELLITUS WITH DIABETIC POLYNEUROPATHY, WITH LONG-TERM CURRENT USE OF INSULIN (HCC): Primary | ICD-10-CM

## 2021-10-18 DIAGNOSIS — Z12.5 SCREENING PSA (PROSTATE SPECIFIC ANTIGEN): ICD-10-CM

## 2021-10-18 DIAGNOSIS — R60.0 LOCALIZED EDEMA: ICD-10-CM

## 2021-10-18 DIAGNOSIS — Z13.220 SCREENING FOR HYPERLIPIDEMIA: ICD-10-CM

## 2021-10-18 DIAGNOSIS — Z00.00 ROUTINE GENERAL MEDICAL EXAMINATION AT A HEALTH CARE FACILITY: ICD-10-CM

## 2021-10-18 DIAGNOSIS — E11.42 TYPE 2 DIABETES MELLITUS WITH DIABETIC POLYNEUROPATHY, WITH LONG-TERM CURRENT USE OF INSULIN (HCC): Primary | ICD-10-CM

## 2021-10-18 PROBLEM — M16.11 UNILATERAL PRIMARY OSTEOARTHRITIS, RIGHT HIP: Status: ACTIVE | Noted: 2021-07-23

## 2021-10-18 LAB — HBA1C MFR BLD: 7.4 %

## 2021-10-18 PROCEDURE — 3051F HG A1C>EQUAL 7.0%<8.0%: CPT | Performed by: NURSE PRACTITIONER

## 2021-10-18 PROCEDURE — 83036 HEMOGLOBIN GLYCOSYLATED A1C: CPT | Performed by: NURSE PRACTITIONER

## 2021-10-18 PROCEDURE — 99214 OFFICE O/P EST MOD 30 MIN: CPT | Performed by: NURSE PRACTITIONER

## 2021-10-18 RX ORDER — TORSEMIDE 20 MG/1
20 TABLET ORAL 2 TIMES DAILY
Qty: 180 TABLET | Refills: 2 | Status: SHIPPED | OUTPATIENT
Start: 2021-10-18 | End: 2021-10-21 | Stop reason: SDUPTHER

## 2021-10-18 RX ORDER — INSULIN GLARGINE 300 U/ML
26 INJECTION, SOLUTION SUBCUTANEOUS DAILY
Qty: 6 PEN | Refills: 3 | Status: SHIPPED | OUTPATIENT
Start: 2021-10-18

## 2021-10-18 RX ORDER — CELECOXIB 200 MG/1
200 CAPSULE ORAL DAILY
COMMUNITY
Start: 2021-08-30 | End: 2022-02-08

## 2021-10-18 RX ORDER — ALPHA LIPOIC ACID 300 MG
300 CAPSULE ORAL
COMMUNITY

## 2021-10-18 NOTE — PROGRESS NOTES
[address] [address] PRIMARY CARE  [facility] [address]  [address] [place] [address]  Dept: [phone]    Yannick Crockett is a 78 y.o. male Established patient, who presents today for his medical conditions/complaints as noted below. Chief Complaint   Patient presents with    Diabetes    Other     pt has hip surgery 11/23/2021 - declines flu vaccine today       HPI:     HPI  He is having right hip total replacement surgery in [place] [facility] by Dr. Christianne Lewis. He has had Covid vaccine and booster. Dr. Christianne Lewis does not want him to have flu vaccine this close to surgery. [email or web address] [phone]    He is starting to gain weight back due to less exercise related to hip pain    He does not take blood sugar often but when he does it is 140-170. No episodes of blood sugar being too high or too low that he is aware of. He is not exercising due to hip pain. He is having trouble sleeping due to pain in foot.     Reviewed prior notes Orthopedics  Reviewed previous      LDL Calculated (mg/dL)   Date Value   12/13/2019 52   12/02/2016 46       (goal LDL is <100)   BUN (mg/dL)   Date Value   08/05/2019 22     Hemoglobin A1C (%)   Date Value   04/08/2021 6.8     BP Readings from Last 3 Encounters:   10/18/21 118/64   05/13/21 106/60   04/08/21 118/62          (goal 120/80)    Past Medical History:   Diagnosis Date    GERD (gastroesophageal reflux disease)     Hyperlipidemia     Hypertension     Type 2 diabetes mellitus without complication ([facility] [address]       Past Surgical History:   Procedure Laterality Date    ANTERIOR CRUCIATE LIGAMENT REPAIR      NECK SURGERY      REFRACTIVE SURGERY Bilateral 2013       Family History   Problem Relation Age of Onset    Diabetes Mother     Cancer Father        Social History     Tobacco Use    Smoking status: Never Smoker    Smokeless tobacco: Never Used   Substance Use Topics    Alcohol use: Yes      Current Outpatient Medications Medication Sig Dispense Refill    Alpha-Lipoic Acid 300 MG CAPS Take 300 mg by mouth      L-Methylfolate-Algae-B12-B6 (METANX PO) Take by mouth 2 times daily      celecoxib (CELEBREX) 200 MG capsule Take 200 mg by mouth daily      torsemide (DEMADEX) 20 MG tablet Take 1 tablet by mouth 2 times daily TAKE 1 TABLET BY MOUTH EVERY  tablet 2    Insulin Glargine, 2 Unit Dial, (TOUJEO MAX SOLOSTAR) 300 UNIT/ML SOPN Inject 26 Units into the skin daily 6 pen 3    metFORMIN (GLUCOPHAGE) 500 MG tablet TAKE 2 TABLETS TWICE A DAY WITH MEALS 360 tablet 3    lisinopril (PRINIVIL;ZESTRIL) 20 MG tablet TAKE 1 TABLET DAILY 90 tablet 3    Insulin Pen Needle (NOVOFINE) 32G X 6 MM MISC USE AS DIRECTED 100 each 3    finasteride (PROSCAR) 5 MG tablet TAKE 1 TABLET DAILY 90 tablet 3    simvastatin (ZOCOR) 20 MG tablet Take 1 tablet by mouth nightly 90 tablet 3    tamsulosin (FLOMAX) 0.4 MG capsule Take 1 capsule by mouth daily 90 capsule 3    omeprazole (PRILOSEC) 40 MG delayed release capsule Take 1 capsule by mouth daily 90 capsule 3    Lancets MISC 1 each by Does not apply route 2 times daily 100 each 5    hyoscyamine (LEVSIN) 125 MCG tablet Take 0.125 mg by mouth every 4 hours as needed      Probiotic Product (PROBIOTIC-10) CHEW Take by mouth      fluticasone (FLONASE) 50 MCG/ACT nasal spray 1 spray by Nasal route daily      L-methylfolate-B6-B12 (METANX) 3-90.314-2-35 MG CAPS capsule Take by mouth 2 times daily (Patient not taking: Reported on 10/18/2021)       No current facility-administered medications for this visit.      Allergies   Allergen Reactions    Zantac [Ranitidine Hcl] Shortness Of Breath     Chest tightness    Shellfish Allergy Other (See Comments)     Chest tightness, sometimes sever diarhea    Shrimp (Diagnostic) Nausea And Vomiting       Health Maintenance   Topic Date Due    Shingles Vaccine (1 of 2) Never done    Lipid screen  12/13/2020    Flu vaccine (1) 09/01/2021    Potassium monitoring  11/18/2021    Creatinine monitoring  11/18/2021    A1C test (Diabetic or Prediabetic)  04/08/2022    Diabetic microalbuminuria test  04/08/2022    Annual Wellness Visit (AWV)  04/09/2022    Diabetic retinal exam  05/19/2022    Diabetic foot exam  10/18/2022    DTaP/Tdap/Td vaccine (2 - Td or Tdap) 10/08/2027    Colon cancer screen colonoscopy  10/17/2028    Pneumococcal 65+ years Vaccine  Completed    COVID-19 Vaccine  Completed    Hepatitis C screen  Completed    Hepatitis A vaccine  Aged Out    Hib vaccine  Aged Out    Meningococcal (ACWY) vaccine  Aged Out       Subjective:      Review of Systems   Constitutional: Positive for fatigue. Negative for chills and fever. HENT: Negative for congestion and ear pain. Eyes: Negative for pain and redness. Respiratory: Negative for cough and shortness of breath. Gastrointestinal: Negative for constipation, diarrhea and nausea. Genitourinary: Negative for difficulty urinating and dysuria. Musculoskeletal: Positive for arthralgias, back pain and gait problem. Neurological: Negative for dizziness and headaches. Psychiatric/Behavioral: Negative for confusion and sleep disturbance. The patient is not nervous/anxious. Objective:     /64   Pulse 113   Wt 273 lb (123.8 kg)   SpO2 98%   BMI 38.08 kg/m²   Physical Exam  Vitals and nursing note reviewed. Constitutional:       Appearance: He is obese. HENT:      Head: Normocephalic and atraumatic. Right Ear: Tympanic membrane, ear canal and external ear normal.      Left Ear: Tympanic membrane, ear canal and external ear normal.   Cardiovascular:      Rate and Rhythm: Normal rate and regular rhythm. Pulses:           Dorsalis pedis pulses are 0 on the right side and 0 on the left side. Posterior tibial pulses are 0 on the right side. Heart sounds: Normal heart sounds.       Comments: No carotid bruit  Pulmonary:      Breath sounds: Normal breath sounds. Abdominal:      General: Bowel sounds are normal.      Palpations: Abdomen is soft. Musculoskeletal:      Right hip: Tenderness and bony tenderness present. Decreased range of motion. Right lower le+ Pitting Edema present. Left lower le+ Pitting Edema present. Feet:    Feet:      Right foot:      Protective Sensation: 10 sites tested. 0 sites sensed. Skin integrity: Callus and dry skin present. Toenail Condition: Right toenails are abnormally thick and long. Left foot:      Protective Sensation: 10 sites tested. 0 sites sensed. Skin integrity: Callus and dry skin present. Toenail Condition: Left toenails are abnormally thick and long. Skin:     General: Skin is warm and dry. Neurological:      Mental Status: He is alert and oriented to person, place, and time. Psychiatric:         Mood and Affect: Mood normal.         Thought Content: Thought content normal.         Assessment/Plan:   1. Type 2 diabetes mellitus with diabetic polyneuropathy, with long-term current use of insulin (HCC)  -     POCT microalbumin  -     POCT glycosylated hemoglobin (Hb A1C)  -     Lipid, Fasting; Future  -     Comprehensive Metabolic Panel; Future  -     CBC With Auto Differential; Future  -      DIABETES FOOT EXAM  -     Insulin Glargine, 2 Unit Dial, (TOUJEO MAX SOLOSTAR) 300 UNIT/ML SOPN; Inject 26 Units into the skin daily, Disp-6 pen, R-3Normal  2. Routine general medical examination at a health care facility  -     Lipid, Fasting; Future  -     Comprehensive Metabolic Panel; Future  -     PSA screening; Future  -     CBC With Auto Differential; Future  3. Screening PSA (prostate specific antigen)  -     PSA screening; Future  4. Screening for hyperlipidemia  -     Lipid, Fasting; Future  5. Localized edema  -     torsemide (DEMADEX) 20 MG tablet;  Take 1 tablet by mouth 2 times daily TAKE 1 TABLET BY MOUTH EVERY DAY, Disp-180 tablet, R-2Normal       Return in about 4 months (around 2/18/2022) for diabetes. Orders Placed This Encounter   Procedures    Lipid, Fasting     Standing Status:   Future     Standing Expiration Date:   10/18/2022    Comprehensive Metabolic Panel     Standing Status:   Future     Standing Expiration Date:   10/18/2022    PSA screening     Standing Status:   Future     Standing Expiration Date:   10/18/2022    CBC With Auto Differential     Standing Status:   Future     Standing Expiration Date:   10/18/2022    POCT microalbumin    POCT glycosylated hemoglobin (Hb A1C)    HM DIABETES FOOT EXAM     Orders Placed This Encounter   Medications    torsemide (DEMADEX) 20 MG tablet     Sig: Take 1 tablet by mouth 2 times daily TAKE 1 TABLET BY MOUTH EVERY DAY     Dispense:  180 tablet     Refill:  2    Insulin Glargine, 2 Unit Dial, (TOUJEO MAX SOLOSTAR) 300 UNIT/ML SOPN     Sig: Inject 26 Units into the skin daily     Dispense:  6 pen     Refill:  3       Patient given educational materials - see patient instructions. Discussed use, benefit, and side effects of prescribed medications. All patient questions answered. Pt voiced understanding. Reviewed health maintenance. Instructed to continue current medications, diet and exercise. Patient agreed with treatment plan. Follow up as directed.      Electronically signed by KADIE Mitchell CNP on 10/18/2021 at 11:09 AM

## 2021-10-19 ASSESSMENT — ENCOUNTER SYMPTOMS
EYE PAIN: 0
EYE REDNESS: 0
COUGH: 0
SHORTNESS OF BREATH: 0
CONSTIPATION: 0
DIARRHEA: 0
BACK PAIN: 1
NAUSEA: 0

## 2021-10-21 ENCOUNTER — TELEPHONE (OUTPATIENT)
Dept: PRIMARY CARE CLINIC | Age: 66
End: 2021-10-21

## 2021-10-21 DIAGNOSIS — R60.0 LOCALIZED EDEMA: ICD-10-CM

## 2021-10-21 RX ORDER — TORSEMIDE 20 MG/1
20 TABLET ORAL 2 TIMES DAILY
Qty: 180 TABLET | Refills: 2 | Status: SHIPPED | OUTPATIENT
Start: 2021-10-21 | End: 2021-11-08 | Stop reason: SDUPTHER

## 2021-10-21 NOTE — TELEPHONE ENCOUNTER
[facility] called asking for clarification on torsemide, there is two sets of directions.  Please send in new rx and note to pharmacy stating to disregard original rx

## 2021-11-02 LAB
ALBUMIN SERPL-MCNC: NORMAL G/DL
ALP BLD-CCNC: NORMAL U/L
ALT SERPL-CCNC: NORMAL U/L
ANION GAP SERPL CALCULATED.3IONS-SCNC: NORMAL MMOL/L
AST SERPL-CCNC: NORMAL U/L
BASOPHILS ABSOLUTE: NORMAL
BASOPHILS RELATIVE PERCENT: NORMAL
BILIRUB SERPL-MCNC: NORMAL MG/DL
BUN BLDV-MCNC: NORMAL MG/DL
CALCIUM SERPL-MCNC: NORMAL MG/DL
CHLORIDE BLD-SCNC: NORMAL MMOL/L
CHOLESTEROL, FASTING: 138
CO2: NORMAL
CREAT SERPL-MCNC: NORMAL MG/DL
EOSINOPHILS ABSOLUTE: NORMAL
EOSINOPHILS RELATIVE PERCENT: NORMAL
GFR CALCULATED: NORMAL
GLUCOSE BLD-MCNC: 140 MG/DL
HCT VFR BLD CALC: NORMAL %
HDLC SERPL-MCNC: 48 MG/DL (ref 35–70)
HEMOGLOBIN: NORMAL
LDL CHOLESTEROL CALCULATED: 52 MG/DL (ref 0–160)
LYMPHOCYTES ABSOLUTE: NORMAL
LYMPHOCYTES RELATIVE PERCENT: NORMAL
MCH RBC QN AUTO: NORMAL PG
MCHC RBC AUTO-ENTMCNC: NORMAL G/DL
MCV RBC AUTO: NORMAL FL
MONOCYTES ABSOLUTE: NORMAL
MONOCYTES RELATIVE PERCENT: NORMAL
NEUTROPHILS ABSOLUTE: NORMAL
NEUTROPHILS RELATIVE PERCENT: NORMAL
PDW BLD-RTO: NORMAL %
PLATELET # BLD: NORMAL 10*3/UL
PMV BLD AUTO: NORMAL FL
POTASSIUM SERPL-SCNC: NORMAL MMOL/L
PROSTATE SPECIFIC ANTIGEN: NORMAL
RBC # BLD: NORMAL 10*6/UL
SODIUM BLD-SCNC: NORMAL MMOL/L
TOTAL PROTEIN: NORMAL
TRIGLYCERIDE, FASTING: 191
WBC # BLD: NORMAL 10*3/UL

## 2021-11-03 DIAGNOSIS — Z13.220 SCREENING FOR HYPERLIPIDEMIA: ICD-10-CM

## 2021-11-03 DIAGNOSIS — Z79.4 TYPE 2 DIABETES MELLITUS WITH DIABETIC POLYNEUROPATHY, WITH LONG-TERM CURRENT USE OF INSULIN (HCC): ICD-10-CM

## 2021-11-03 DIAGNOSIS — Z12.5 SCREENING PSA (PROSTATE SPECIFIC ANTIGEN): ICD-10-CM

## 2021-11-03 DIAGNOSIS — Z00.00 ROUTINE GENERAL MEDICAL EXAMINATION AT A HEALTH CARE FACILITY: ICD-10-CM

## 2021-11-03 DIAGNOSIS — E11.42 TYPE 2 DIABETES MELLITUS WITH DIABETIC POLYNEUROPATHY, WITH LONG-TERM CURRENT USE OF INSULIN (HCC): ICD-10-CM

## 2021-11-08 ENCOUNTER — OFFICE VISIT (OUTPATIENT)
Dept: PRIMARY CARE CLINIC | Age: 66
End: 2021-11-08
Payer: COMMERCIAL

## 2021-11-08 VITALS
DIASTOLIC BLOOD PRESSURE: 66 MMHG | WEIGHT: 263 LBS | HEART RATE: 132 BPM | OXYGEN SATURATION: 98 % | BODY MASS INDEX: 36.68 KG/M2 | SYSTOLIC BLOOD PRESSURE: 110 MMHG

## 2021-11-08 DIAGNOSIS — I10 ESSENTIAL HYPERTENSION: ICD-10-CM

## 2021-11-08 DIAGNOSIS — J00 COMMON COLD: ICD-10-CM

## 2021-11-08 DIAGNOSIS — Z79.4 TYPE 2 DIABETES MELLITUS WITH DIABETIC POLYNEUROPATHY, WITH LONG-TERM CURRENT USE OF INSULIN (HCC): ICD-10-CM

## 2021-11-08 DIAGNOSIS — R60.0 LOCALIZED EDEMA: ICD-10-CM

## 2021-11-08 DIAGNOSIS — Z01.818 PREOPERATIVE CLEARANCE: Primary | ICD-10-CM

## 2021-11-08 DIAGNOSIS — E11.42 TYPE 2 DIABETES MELLITUS WITH DIABETIC POLYNEUROPATHY, WITH LONG-TERM CURRENT USE OF INSULIN (HCC): ICD-10-CM

## 2021-11-08 PROCEDURE — 99214 OFFICE O/P EST MOD 30 MIN: CPT | Performed by: NURSE PRACTITIONER

## 2021-11-08 PROCEDURE — 3051F HG A1C>EQUAL 7.0%<8.0%: CPT | Performed by: NURSE PRACTITIONER

## 2021-11-08 RX ORDER — IRON PS COMPLEX/B12/FOLIC ACID 150-25-1
150 CAPSULE ORAL DAILY
COMMUNITY
Start: 2021-11-03 | End: 2022-02-08

## 2021-11-08 RX ORDER — TORSEMIDE 20 MG/1
20 TABLET ORAL DAILY
Qty: 90 TABLET | Refills: 2
Start: 2021-11-08 | End: 2022-10-24

## 2021-11-08 ASSESSMENT — ENCOUNTER SYMPTOMS
NAUSEA: 0
SHORTNESS OF BREATH: 0
SINUS PRESSURE: 1
EYE REDNESS: 0
COUGH: 1
DIARRHEA: 0
VOMITING: 0
EYE PAIN: 0
RHINORRHEA: 1
CONSTIPATION: 0
BACK PAIN: 1
WHEEZING: 1

## 2021-11-08 NOTE — PROGRESS NOTES
[address] [facility]  [facility]  [facility] [place] [address]  Dept: [phone]    Lorna Leon is a 78 y.o. male Established patient, who presents today for his medical conditions/complaints as noted below. Chief Complaint   Patient presents with    Pre-op Exam       HPI:     HPI  Dr. Maribel Baez does not want him to have flu vaccine this close to surgery. [address] [phone]. He is having right hip total replacement surgery in [place] by Dr. Maribel Baez. HgA1C = 7.4  BUN = 57,   Creatinine = 1.87  GFR = 36    He has a bit of a cold. He has sore throat, congestion, a little wheezing. Some sneezing and sometimes productive cough. No fever or chills. He does get cold easily.   Reviewed prior notes    Reviewed previous Labs 11/03/21    LDL Calculated (mg/dL)   Date Value   11/02/2021 52   12/13/2019 52   12/02/2016 46       (goal LDL is <100)   BUN (mg/dL)   Date Value   08/05/2019 22     Hemoglobin A1C (%)   Date Value   10/18/2021 7.4     BP Readings from Last 3 Encounters:   11/08/21 110/66   10/18/21 118/64   05/13/21 106/60          (goal 120/80)    Past Medical History:   Diagnosis Date    GERD (gastroesophageal reflux disease)     Hyperlipidemia     Hypertension     Type 2 diabetes mellitus without complication ([facility] [address]       Past Surgical History:   Procedure Laterality Date    ANTERIOR CRUCIATE LIGAMENT REPAIR      NECK SURGERY      REFRACTIVE SURGERY Bilateral 2013       Family History   Problem Relation Age of Onset    Diabetes Mother     Cancer Father        Social History     Tobacco Use    Smoking status: Never Smoker    Smokeless tobacco: Never Used   Substance Use Topics    Alcohol use: Yes      Current Outpatient Medications   Medication Sig Dispense Refill    FERREX 150 FORTE 150-1-25 MG-MG-MCG CAPS capsule Take 150 mg by mouth daily      torsemide (DEMADEX) 20 MG tablet Take 1 tablet by mouth daily 90 tablet 2  Alpha-Lipoic Acid 300 MG CAPS Take 300 mg by mouth      Insulin Glargine, 2 Unit Dial, (TOUJEO MAX SOLOSTAR) 300 UNIT/ML SOPN Inject 26 Units into the skin daily 6 pen 3    metFORMIN (GLUCOPHAGE) 500 MG tablet TAKE 2 TABLETS TWICE A DAY WITH MEALS 360 tablet 3    lisinopril (PRINIVIL;ZESTRIL) 20 MG tablet TAKE 1 TABLET DAILY 90 tablet 3    Insulin Pen Needle (NOVOFINE) 32G X 6 MM MISC USE AS DIRECTED 100 each 3    finasteride (PROSCAR) 5 MG tablet TAKE 1 TABLET DAILY 90 tablet 3    simvastatin (ZOCOR) 20 MG tablet Take 1 tablet by mouth nightly 90 tablet 3    tamsulosin (FLOMAX) 0.4 MG capsule Take 1 capsule by mouth daily 90 capsule 3    omeprazole (PRILOSEC) 40 MG delayed release capsule Take 1 capsule by mouth daily 90 capsule 3    Lancets MISC 1 each by Does not apply route 2 times daily 100 each 5    hyoscyamine (LEVSIN) 125 MCG tablet Take 0.125 mg by mouth every 4 hours as needed      L-methylfolate-B6-B12 (METANX) 3-90.314-2-35 MG CAPS capsule Take by mouth 2 times daily       Probiotic Product (PROBIOTIC-10) CHEW Take by mouth      fluticasone (FLONASE) 50 MCG/ACT nasal spray 1 spray by Nasal route daily      L-Methylfolate-Algae-B12-B6 (METANX PO) Take by mouth 2 times daily (Patient not taking: Reported on 11/8/2021)      celecoxib (CELEBREX) 200 MG capsule Take 200 mg by mouth daily (Patient not taking: Reported on 11/8/2021)       No current facility-administered medications for this visit.      Allergies   Allergen Reactions    Zantac [Ranitidine Hcl] Shortness Of Breath     Chest tightness    Shellfish Allergy Other (See Comments)     Chest tightness, sometimes sever diarhea    Shrimp (Diagnostic) Nausea And Vomiting       Health Maintenance   Topic Date Due    Shingles Vaccine (1 of 2) Never done    Flu vaccine (1) 09/01/2021    Diabetic microalbuminuria test  04/08/2022    Annual Wellness Visit (AWV)  04/09/2022    Diabetic retinal exam  05/19/2022    Diabetic foot exam 10/18/2022    A1C test (Diabetic or Prediabetic)  10/18/2022    Lipid screen  11/02/2022    Potassium monitoring  11/02/2022    Creatinine monitoring  11/02/2022    DTaP/Tdap/Td vaccine (2 - Td or Tdap) 10/08/2027    Colon cancer screen colonoscopy  10/17/2028    Pneumococcal 65+ years Vaccine  Completed    COVID-19 Vaccine  Completed    Hepatitis C screen  Completed    Hepatitis A vaccine  Aged Out    Hib vaccine  Aged Out    Meningococcal (ACWY) vaccine  Aged Out       Subjective:      Review of Systems   Constitutional: Negative for chills, fatigue and fever. HENT: Positive for congestion, rhinorrhea, sinus pressure and sneezing. Eyes: Negative for pain and redness. Respiratory: Positive for cough and wheezing. Negative for shortness of breath. Cardiovascular: Negative for chest pain, palpitations and leg swelling. Gastrointestinal: Negative for constipation, diarrhea, nausea and vomiting. Genitourinary: Negative for difficulty urinating and dysuria. Musculoskeletal: Positive for arthralgias and back pain. Skin: Negative for rash and wound. Psychiatric/Behavioral: Negative for dysphoric mood and sleep disturbance. The patient is not nervous/anxious. Objective:     /66   Pulse 132   Wt 263 lb (119.3 kg)   SpO2 98%   BMI 36.68 kg/m²   Physical Exam  Constitutional:       Appearance: He is obese. HENT:      Head: Normocephalic and atraumatic. Right Ear: Hearing, ear canal and external ear normal.      Ears:      Comments: Left TM sightly protruding     Mouth/Throat:      Mouth: Mucous membranes are moist.      Pharynx: Oropharynx is clear. Eyes:      Extraocular Movements: Extraocular movements intact. Pupils: Pupils are equal, round, and reactive to light. Cardiovascular:      Rate and Rhythm: Normal rate and regular rhythm. Heart sounds: Normal heart sounds. No murmur heard.        Comments: No carotid bruit  Pulmonary:      Effort: Pulmonary effort is normal.      Breath sounds: Normal breath sounds. Abdominal:      General: Bowel sounds are normal.      Palpations: Abdomen is soft. Musculoskeletal:      Cervical back: Normal range of motion. No rigidity. Right lower le+ Pitting Edema present. Left lower le+ Pitting Edema present. Skin:     General: Skin is warm and dry. Capillary Refill: Capillary refill takes less than 2 seconds. Neurological:      Mental Status: He is alert and oriented to person, place, and time. Psychiatric:         Mood and Affect: Mood normal.         Behavior: Behavior normal.         Thought Content: Thought content normal.         Assessment/Plan:   1. Preoperative clearance  2. Essential hypertension  3. Type 2 diabetes mellitus with diabetic polyneuropathy, with long-term current use of insulin ([address]  4. Localized edema  -     torsemide (DEMADEX) 20 MG tablet; Take 1 tablet by mouth daily, Disp-90 tablet, R-2NO PRINT  5. Common cold     Decrease torsemide to one tablet daily. Increase water intake. Cleared for total right hip surgery. If common cold does not improve by the end of the week call office. Return in about 3 months (around 2022) for diabetes. No orders of the defined types were placed in this encounter. Orders Placed This Encounter   Medications    torsemide (DEMADEX) 20 MG tablet     Sig: Take 1 tablet by mouth daily     Dispense:  90 tablet     Refill:  2       Patient given educational materials - see patient instructions. Discussed use, benefit, and side effects of prescribed medications. All patient questions answered. Pt voiced understanding. Reviewed health maintenance. Instructed to continue current medications, diet and exercise. Patient agreed with treatment plan. Follow up as directed.      Electronically signed by KADIE Canada CNP on 2021 at 11:41 AM

## 2021-11-08 NOTE — PATIENT INSTRUCTIONS
Decrease torsemide to one tablet daily. Increase water intake. Cleared for total right hip surgery. If common cold does not improve by the end of the week call office.

## 2022-01-11 ENCOUNTER — TELEPHONE (OUTPATIENT)
Dept: PRIMARY CARE CLINIC | Age: 67
End: 2022-01-11

## 2022-01-11 DIAGNOSIS — I10 ESSENTIAL HYPERTENSION: Primary | ICD-10-CM

## 2022-01-11 RX ORDER — IRBESARTAN 150 MG/1
150 TABLET ORAL DAILY
Qty: 90 TABLET | Refills: 1 | Status: SHIPPED | OUTPATIENT
Start: 2022-01-11 | End: 2022-02-08

## 2022-01-11 RX ORDER — IRBESARTAN 300 MG/1
300 TABLET ORAL DAILY
Qty: 90 TABLET | Refills: 1 | Status: SHIPPED | OUTPATIENT
Start: 2022-01-11 | End: 2022-01-11 | Stop reason: DRUGHIGH

## 2022-01-11 NOTE — TELEPHONE ENCOUNTER
Irbesartan ordered from [employer]. Stop taking lisinopril when irbesartan arrives an start taking irbesartan 150 mg daily.

## 2022-01-12 RX ORDER — SIMVASTATIN 20 MG
TABLET ORAL
Qty: 90 TABLET | Refills: 3 | Status: SHIPPED | OUTPATIENT
Start: 2022-01-12

## 2022-01-12 RX ORDER — TAMSULOSIN HYDROCHLORIDE 0.4 MG/1
CAPSULE ORAL
Qty: 90 CAPSULE | Refills: 3 | Status: SHIPPED | OUTPATIENT
Start: 2022-01-12

## 2022-02-08 ENCOUNTER — OFFICE VISIT (OUTPATIENT)
Dept: PRIMARY CARE CLINIC | Age: 67
End: 2022-02-08
Payer: COMMERCIAL

## 2022-02-08 VITALS
SYSTOLIC BLOOD PRESSURE: 120 MMHG | DIASTOLIC BLOOD PRESSURE: 68 MMHG | WEIGHT: 264.8 LBS | HEART RATE: 124 BPM | BODY MASS INDEX: 36.93 KG/M2 | OXYGEN SATURATION: 98 %

## 2022-02-08 DIAGNOSIS — I10 ESSENTIAL HYPERTENSION: ICD-10-CM

## 2022-02-08 DIAGNOSIS — E11.42 TYPE 2 DIABETES MELLITUS WITH DIABETIC POLYNEUROPATHY, WITH LONG-TERM CURRENT USE OF INSULIN (HCC): Primary | ICD-10-CM

## 2022-02-08 DIAGNOSIS — Z79.4 TYPE 2 DIABETES MELLITUS WITH DIABETIC POLYNEUROPATHY, WITH LONG-TERM CURRENT USE OF INSULIN (HCC): Primary | ICD-10-CM

## 2022-02-08 LAB — HBA1C MFR BLD: 6.8 %

## 2022-02-08 PROCEDURE — 82044 UR ALBUMIN SEMIQUANTITATIVE: CPT | Performed by: NURSE PRACTITIONER

## 2022-02-08 PROCEDURE — 83036 HEMOGLOBIN GLYCOSYLATED A1C: CPT | Performed by: NURSE PRACTITIONER

## 2022-02-08 PROCEDURE — 99214 OFFICE O/P EST MOD 30 MIN: CPT | Performed by: NURSE PRACTITIONER

## 2022-02-08 RX ORDER — IRBESARTAN 150 MG/1
150 TABLET ORAL NIGHTLY
COMMUNITY
End: 2022-04-04 | Stop reason: SDUPTHER

## 2022-02-08 ASSESSMENT — ENCOUNTER SYMPTOMS
COUGH: 1
NAUSEA: 0
CONSTIPATION: 0
DIARRHEA: 0
EYE REDNESS: 0
BACK PAIN: 1
PHOTOPHOBIA: 0

## 2022-02-08 NOTE — PROGRESS NOTES
[address] [address]  [address] [address]  Dept: [phone]    Keyana Randolph is a 78 y.o. male Established patient, who presents today for his medical conditions/complaints as noted below. Chief Complaint   Patient presents with    Diabetes    Knee Injury     pt thinks it is from hip surgery (11/23/2021)       HPI:     HPI  He had right hip replacement on 11/23/2021. He spent a couple weeks at [facility] for rehab. He stopped using cane a couple weeks ago. He got flu shot a couple weeks ago. He is considering shingles vaccine. He states he is having more right knee pain than right hip pain. It is hard to get up and down. He is not doing treadmill yet. He is not taking anything for pain. He was getting blood sugar tested daily or sometimes 2x/day while at [facility]. He checks blood sugar about weekly at home. [phone]  He had an episode where blood sugar was high after eating a frosty. No particular high or low readings. He is still doing therapy - outpatient.     Reviewed prior notes Orthopedics  Reviewed previous Labs and Hospital Records    LDL Calculated (mg/dL)   Date Value   11/02/2021 52   12/13/2019 52   12/02/2016 46       (goal LDL is <100)   BUN (mg/dL)   Date Value   08/05/2019 22     Hemoglobin A1C (%)   Date Value   10/18/2021 7.4     BP Readings from Last 3 Encounters:   02/08/22 120/68   11/08/21 110/66   10/18/21 118/64          (goal 120/80)    Past Medical History:   Diagnosis Date    GERD (gastroesophageal reflux disease)     Hyperlipidemia     Hypertension     Type 2 diabetes mellitus without complication (HCC)       Past Surgical History:   Procedure Laterality Date    ANTERIOR CRUCIATE LIGAMENT REPAIR      NECK SURGERY      REFRACTIVE SURGERY Bilateral 2013       Family History   Problem Relation Age of Onset    Diabetes Mother     Cancer Father        Social History     Tobacco Use    Smoking status: Never Smoker    Smokeless tobacco: Never Used   Substance Use Topics    Alcohol use: Yes      Current Outpatient Medications   Medication Sig Dispense Refill    simvastatin (ZOCOR) 20 MG tablet TAKE 1 TABLET NIGHTLY 90 tablet 3    tamsulosin (FLOMAX) 0.4 MG capsule TAKE 1 CAPSULE DAILY 90 capsule 3    Insulin Pen Needle 32G X 6 MM MISC 1 each by Does not apply route daily 100 each 3    torsemide (DEMADEX) 20 MG tablet Take 1 tablet by mouth daily 90 tablet 2    Alpha-Lipoic Acid 300 MG CAPS Take 300 mg by mouth      Insulin Glargine, 2 Unit Dial, (TOUJEO MAX SOLOSTAR) 300 UNIT/ML SOPN Inject 26 Units into the skin daily 6 pen 3    metFORMIN (GLUCOPHAGE) 500 MG tablet TAKE 2 TABLETS TWICE A DAY WITH MEALS 360 tablet 3    finasteride (PROSCAR) 5 MG tablet TAKE 1 TABLET DAILY 90 tablet 3    omeprazole (PRILOSEC) 40 MG delayed release capsule Take 1 capsule by mouth daily 90 capsule 3    Lancets MISC 1 each by Does not apply route 2 times daily 100 each 5    hyoscyamine (LEVSIN) 125 MCG tablet Take 0.125 mg by mouth every 4 hours as needed      L-methylfolate-B6-B12 (METANX) 2-10.751-2-57 MG CAPS capsule Take by mouth 2 times daily        No current facility-administered medications for this visit.      Allergies   Allergen Reactions    Seasonal Anaphylaxis    Zantac [Ranitidine Hcl] Shortness Of Breath     Chest tightness    Zantac [Ranitidine] Other (See Comments)    Shellfish Allergy Other (See Comments)     Chest tightness, sometimes sever diarhea    Shrimp (Diagnostic) Nausea And Vomiting       Health Maintenance   Topic Date Due    Shingles Vaccine (1 of 2) Never done    Diabetic microalbuminuria test  04/08/2022    Annual Wellness Visit (AWV)  04/09/2022    Depression Screen  05/13/2022    Diabetic retinal exam  05/19/2022    Diabetic foot exam  10/18/2022    A1C test (Diabetic or Prediabetic)  11/02/2022    Lipid screen  11/02/2022    Potassium monitoring  11/02/2022    Creatinine monitoring  11/02/2022    DTaP/Tdap/Td vaccine (2 - Td or Tdap) 10/08/2027    Colon cancer screen colonoscopy  10/17/2028    Flu vaccine  Completed    Pneumococcal 65+ years Vaccine  Completed    COVID-19 Vaccine  Completed    Hepatitis C screen  Completed    Hepatitis A vaccine  Aged Out    Hib vaccine  Aged Out    Meningococcal (ACWY) vaccine  Aged Out       Subjective:      Review of Systems   Constitutional: Negative for chills, fatigue and fever. HENT: Negative for congestion, ear pain and nosebleeds. Eyes: Negative for photophobia and redness. Respiratory: Positive for cough (has improved with DC in lisinopril). Cardiovascular: Negative for chest pain, palpitations and leg swelling. Gastrointestinal: Negative for constipation, diarrhea and nausea. Genitourinary: Negative for difficulty urinating and dysuria. Musculoskeletal: Positive for arthralgias and back pain. Neurological: Positive for light-headedness (when coughing frequently). Psychiatric/Behavioral: Negative for dysphoric mood and sleep disturbance. The patient is not nervous/anxious. Objective:     /68   Pulse 124   Wt 264 lb 12.8 oz (120.1 kg)   SpO2 98%   BMI 36.93 kg/m²   Physical Exam  Vitals and nursing note reviewed. Constitutional:       Appearance: He is obese. Cardiovascular:      Rate and Rhythm: Normal rate and regular rhythm. Heart sounds: Normal heart sounds. Comments: No carotid bruit  Bilateral lower extremity edema +2 L > R  Pulmonary:      Effort: Pulmonary effort is normal.      Breath sounds: Normal breath sounds. Abdominal:      General: Bowel sounds are normal.      Palpations: Abdomen is soft. Skin:            Comments: Open skin abrasion   Neurological:      Mental Status: He is alert and oriented to person, place, and time. Psychiatric:         Mood and Affect: Mood normal.         Thought Content: Thought content normal.         Assessment/Plan:   1.  Type 2 diabetes mellitus with diabetic polyneuropathy, with long-term current use of insulin (HCC)  -     POCT glycosylated hemoglobin (Hb A1C)  -     POCT microalbumin  2. Essential hypertension       HgA1C = 6.8  Today - improved  Return in about 3 months (around 5/8/2022) for AWV - , diabetes. Orders Placed This Encounter   Procedures    POCT glycosylated hemoglobin (Hb A1C)    POCT microalbumin     No orders of the defined types were placed in this encounter. Patient given educational materials - see patient instructions. Discussed use, benefit, and side effects of prescribed medications. All patient questions answered. Pt voiced understanding. Reviewed health maintenance. Instructed to continue current medications, diet and exercise. Patient agreed with treatment plan. Follow up as directed.      Electronically signed by KADIE Adam CNP on 2/8/2022 at 12:06 PM

## 2022-02-12 RX ORDER — OMEPRAZOLE 40 MG/1
CAPSULE, DELAYED RELEASE ORAL
Qty: 90 CAPSULE | Refills: 3 | Status: SHIPPED | OUTPATIENT
Start: 2022-02-12

## 2022-03-17 RX ORDER — PEN NEEDLE, DIABETIC 32 GX 1/4"
NEEDLE, DISPOSABLE MISCELLANEOUS
Qty: 100 EACH | Refills: 3 | Status: SHIPPED | OUTPATIENT
Start: 2022-03-17

## 2022-04-04 RX ORDER — IRBESARTAN 150 MG/1
150 TABLET ORAL NIGHTLY
Qty: 90 TABLET | Refills: 3 | Status: SHIPPED | OUTPATIENT
Start: 2022-04-04

## 2022-05-09 ENCOUNTER — OFFICE VISIT (OUTPATIENT)
Dept: PRIMARY CARE CLINIC | Age: 67
End: 2022-05-09
Payer: COMMERCIAL

## 2022-05-09 VITALS
DIASTOLIC BLOOD PRESSURE: 68 MMHG | WEIGHT: 262.3 LBS | SYSTOLIC BLOOD PRESSURE: 110 MMHG | HEART RATE: 125 BPM | OXYGEN SATURATION: 96 % | BODY MASS INDEX: 36.58 KG/M2

## 2022-05-09 DIAGNOSIS — N40.1 BENIGN PROSTATIC HYPERPLASIA WITH WEAK URINARY STREAM: ICD-10-CM

## 2022-05-09 DIAGNOSIS — I10 ESSENTIAL HYPERTENSION: ICD-10-CM

## 2022-05-09 DIAGNOSIS — Z79.4 TYPE 2 DIABETES MELLITUS WITH DIABETIC POLYNEUROPATHY, WITH LONG-TERM CURRENT USE OF INSULIN (HCC): Primary | ICD-10-CM

## 2022-05-09 DIAGNOSIS — Z00.00 MEDICARE ANNUAL WELLNESS VISIT, SUBSEQUENT: ICD-10-CM

## 2022-05-09 DIAGNOSIS — R35.0 URINARY FREQUENCY: ICD-10-CM

## 2022-05-09 DIAGNOSIS — R39.12 BENIGN PROSTATIC HYPERPLASIA WITH WEAK URINARY STREAM: ICD-10-CM

## 2022-05-09 DIAGNOSIS — E11.42 TYPE 2 DIABETES MELLITUS WITH DIABETIC POLYNEUROPATHY, WITH LONG-TERM CURRENT USE OF INSULIN (HCC): Primary | ICD-10-CM

## 2022-05-09 LAB
BILIRUBIN, POC: NEGATIVE
BLOOD URINE, POC: NEGATIVE
CLARITY, POC: NORMAL
COLOR, POC: NORMAL
CREATININE URINE POCT: 100
GLUCOSE URINE, POC: NEGATIVE
HBA1C MFR BLD: 7.5 %
KETONES, POC: NEGATIVE
LEUKOCYTE EST, POC: NEGATIVE
MICROALBUMIN/CREAT 24H UR: 10 MG/G{CREAT}
MICROALBUMIN/CREAT UR-RTO: 30
NITRITE, POC: NEGATIVE
PH, POC: 5
PROTEIN, POC: NEGATIVE
SPECIFIC GRAVITY, POC: 1.01
UROBILINOGEN, POC: 0.2

## 2022-05-09 PROCEDURE — 3051F HG A1C>EQUAL 7.0%<8.0%: CPT | Performed by: NURSE PRACTITIONER

## 2022-05-09 PROCEDURE — 83036 HEMOGLOBIN GLYCOSYLATED A1C: CPT | Performed by: NURSE PRACTITIONER

## 2022-05-09 PROCEDURE — 81003 URINALYSIS AUTO W/O SCOPE: CPT | Performed by: NURSE PRACTITIONER

## 2022-05-09 PROCEDURE — 82044 UR ALBUMIN SEMIQUANTITATIVE: CPT | Performed by: NURSE PRACTITIONER

## 2022-05-09 PROCEDURE — G0439 PPPS, SUBSEQ VISIT: HCPCS | Performed by: NURSE PRACTITIONER

## 2022-05-09 RX ORDER — NAPROXEN 250 MG/1
200 TABLET ORAL 2 TIMES DAILY WITH MEALS
COMMUNITY

## 2022-05-09 RX ORDER — FINASTERIDE 5 MG/1
TABLET, FILM COATED ORAL
Qty: 90 TABLET | Refills: 3 | Status: SHIPPED | OUTPATIENT
Start: 2022-05-09

## 2022-05-09 SDOH — ECONOMIC STABILITY: FOOD INSECURITY: WITHIN THE PAST 12 MONTHS, YOU WORRIED THAT YOUR FOOD WOULD RUN OUT BEFORE YOU GOT MONEY TO BUY MORE.: NEVER TRUE

## 2022-05-09 SDOH — ECONOMIC STABILITY: FOOD INSECURITY: WITHIN THE PAST 12 MONTHS, THE FOOD YOU BOUGHT JUST DIDN'T LAST AND YOU DIDN'T HAVE MONEY TO GET MORE.: NEVER TRUE

## 2022-05-09 ASSESSMENT — PATIENT HEALTH QUESTIONNAIRE - PHQ9
SUM OF ALL RESPONSES TO PHQ QUESTIONS 1-9: 0
2. FEELING DOWN, DEPRESSED OR HOPELESS: 0
SUM OF ALL RESPONSES TO PHQ9 QUESTIONS 1 & 2: 0
SUM OF ALL RESPONSES TO PHQ QUESTIONS 1-9: 0
1. LITTLE INTEREST OR PLEASURE IN DOING THINGS: 0
SUM OF ALL RESPONSES TO PHQ QUESTIONS 1-9: 0
SUM OF ALL RESPONSES TO PHQ QUESTIONS 1-9: 0

## 2022-05-09 ASSESSMENT — SOCIAL DETERMINANTS OF HEALTH (SDOH): HOW HARD IS IT FOR YOU TO PAY FOR THE VERY BASICS LIKE FOOD, HOUSING, MEDICAL CARE, AND HEATING?: NOT HARD AT ALL

## 2022-05-09 NOTE — PATIENT INSTRUCTIONS
Eating Healthy Foods: Care Instructions  Your Care Instructions     Eating healthy foods can help lower your risk for disease. Healthy food gives you energy and keeps your heart strong, your brain active, your musclesworking, and your bones strong. A healthy diet includes a variety of foods from the basic food groups: grains, vegetables, fruits, milk and milk products, and meat and beans. Some people may eat more of their favorite foods from only one food group and, as a result, miss getting the nutrients they need. So, it is important to pay attention not only to what you eat but also to what you are missing from your diet. You caneat a healthy, balanced diet by making a few small changes. Follow-up care is a key part of your treatment and safety. Be sure to make and go to all appointments, and call your doctor if you are having problems. It's also a good idea to know your test results and keep alist of the medicines you take. How can you care for yourself at home? Look at what you eat   Keep a food diary for a week or two and record everything you eat or drink. Track the number of servings you eat from each food group.  For a balanced diet every day, eat a variety of:  ? 6 or more ounce-equivalents of grains, such as cereals, breads, crackers, rice, or pasta, every day. An ounce-equivalent is 1 slice of bread, 1 cup of ready-to-eat cereal, or ½ cup of cooked rice, cooked pasta, or cooked cereal.  ? 2½ cups of vegetables, especially:  - Dark-green vegetables such as broccoli and spinach.  - Orange vegetables such as carrots and sweet potatoes. - Dry beans (such as wright and kidney beans) and peas (such as lentils). ? 2 cups of fresh, frozen, or canned fruit. A small apple or 1 banana or orange equals 1 cup. ? 3 cups of nonfat or low-fat milk, yogurt, or other milk products. ? 5½ ounces of meat and beans, such as chicken, fish, lean meat, beans, nuts, and seeds.  One egg, 1 tablespoon of peanut butter, ½ ounce nuts or seeds, or ¼ cup of cooked beans equals 1 ounce of meat.  Learn how to read food labels for serving sizes and ingredients. Fast-food and convenience-food meals often contain few or no fruits or vegetables. Make sure you eat some fruits and vegetables to make the meal more nutritious.  Look at your food diary. For each food group, add up what you have eaten and then divide the total by the number of days. This will give you an idea of how much you are eating from each food group. See if you can find some ways to change your diet to make it more healthy. Start small   Do not try to make dramatic changes to your diet all at once. You might feel that you are missing out on your favorite foods and then be more likely to fail.  Start slowly, and gradually change your habits. Try some of the following:  ? Use whole wheat bread instead of white bread. ? Use nonfat or low-fat milk instead of whole milk. ? Eat brown rice instead of white rice, and eat whole wheat pasta instead of white-flour pasta. ? Try low-fat cheeses and low-fat yogurt. ? Add more fruits and vegetables to meals and have them for snacks. ? Add lettuce, tomato, cucumber, and onion to sandwiches. ? Add fruit to yogurt and cereal.  Enjoy food   You can still eat your favorite foods. You just may need to eat less of them. If your favorite foods are high in fat, salt, and sugar, limit how often you eat them, but do not cut them out entirely.  Eat a wide variety of foods. Make healthy choices when eating out   The type of restaurant you choose can help you make healthy choices. Even fast-food chains are now offering more low-fat or healthier choices on the menu.  Choose smaller portions, or take half of your meal home.  When eating out, try:  ? A veggie pizza with a whole wheat crust or grilled chicken (instead of sausage or pepperoni).   ? Pasta with roasted vegetables, grilled chicken, or marinara sauce instead of cream sauce. ? A vegetable wrap or grilled chicken wrap. ? Broiled or poached food instead of fried or breaded items. Make healthy choices easy   Buy packaged, prewashed, ready-to-eat fresh vegetables and fruits, such as baby carrots, salad mixes, and chopped or shredded broccoli and cauliflower.  Buy packaged, presliced fruits, such as melon or pineapple.  Choose 100% fruit or vegetable juice instead of soda. Limit juice intake to 4 to 6 oz (½ to ¾ cup) a day.  Blend low-fat yogurt, fruit juice, and canned or frozen fruit to make a smoothie for breakfast or a snack. Where can you learn more? Go to https://Keep Me Certified.[employer]. org and sign in to your 123people account. Enter Y759 in the Devver box to learn more about \"Eating Healthy Foods: Care Instructions. \"     If you do not have an account, please click on the \"Sign Up Now\" link. Current as of: September 8, 2021               Content Version: 13.2  © 6437-0715 [employer]. Care instructions adapted under license by [facility] ([facility]). If you have questions about a medical condition or this instruction, always ask your healthcare professional. [address] any warranty or liability for your use of this information. Personalized Preventive Plan for Britta Olvera - 5/9/2022  Medicare offers a range of preventive health benefits. Some of the tests and screenings are paid in full while other may be subject to a deductible, co-insurance, and/or copay. Some of these benefits include a comprehensive review of your medical history including lifestyle, illnesses that may run in your family, and various assessments and screenings as appropriate. After reviewing your medical record and screening and assessments performed today your provider may have ordered immunizations, labs, imaging, and/or referrals for you.   A list of these orders (if applicable) as well as your Preventive Care list are included within your After Visit Summary for your review. Other Preventive Recommendations:    · A preventive eye exam performed by an eye specialist is recommended every 1-2 years to screen for glaucoma; cataracts, macular degeneration, and other eye disorders. · A preventive dental visit is recommended every 6 months. · Try to get at least 150 minutes of exercise per week or 10,000 steps per day on a pedometer . · Order or download the FREE \"Exercise & Physical Activity: Your Everyday Guide\" from [employer]. Call 9-964.584.7581 or search [employer] online. · You need 7804-3094 mg of calcium and 5001-6787 IU of vitamin D per day. It is possible to meet your calcium requirement with diet alone, but a vitamin D supplement is usually necessary to meet this goal.  · When exposed to the sun, use a sunscreen that protects against both UVA and UVB radiation with an SPF of 30 or greater. Reapply every 2 to 3 hours or after sweating, drying off with a towel, or swimming. · Always wear a seat belt when traveling in a car. Always wear a helmet when riding a bicycle or motorcycle. Patient Education        Hypoglycemia: Care Instructions  Overview     Hypoglycemia means that your blood sugar is low and your body is not getting enough fuel. Some people get low blood sugar from not eating often enough. Some medicines to treat diabetes can cause low blood sugar. People who have had surgery on their stomachs or intestines may get hypoglycemia. Problems with thepancreas, kidneys, or liver also can cause low blood sugar. A snack or drink with sugar in it will raise your blood sugar and should easeyour symptoms right away. Your doctor may recommend that you change or stop your medicines until you can get your blood sugar levels under control. In the long run, you may need to change your diet and eating habits so that you get enough fuel for your bodythroughout the day.   Follow-up care is a key part of your treatment and safety. Be sure to make and go to all appointments, and call your doctor if you are having problems. It's also a good idea to know your test results and keep alist of the medicines you take. How can you care for yourself at home? Learn your signs of low blood sugar. They are different for everyone. Some common early signs include:  Nausea. Hunger. Feeling nervous, irritable, or shaky. Cold, clammy skin. Sweating (when you're not exercising). Use the \"rule of 15\" to treat low blood sugar. This includes eating 15 grams of carbohydrate from a quick-sugar food, such as 3 or 4 glucose tablets or ½ cup of juice. Wait 15 minutes and check your blood sugar. If it is still below 70 mg/dL, eat another 15 grams of carbohydrate. Repeat this every 15 minutes until your blood sugar is in a safe target range. Once your blood sugar is in a safe range, eat a snack or meal to prevent recurrent low blood sugar. Make sure family, friends, and coworkers know the symptoms of low blood sugar and know how to get your sugar level up. If you were prescribed a glucagon kit, always have it with you. Make sure friends and family know how to use it. When should you call for help? Call 911 anytime you think you may need emergency care. For example, call if:    You passed out (lost consciousness).     You are confused or cannot think clearly.     Your blood sugar is very high or very low. Watch closely for changes in your health, and be sure to contact your doctor if:    Your blood sugar stays outside the level your doctor set for you.     You have any problems. Where can you learn more? Go to https://HBCSdia.[employer]. org and sign in to your [employer] account. Enter P959 in the Rec[employer] Angel box to learn more about \"Hypoglycemia: Care Instructions. \"     If you do not have an account, please click on the \"Sign Up Now\" link.   Current as of: July 28, 2021               Content Version: 13.2  © 7403-0102 [employer]. Care instructions adapted under license by [facility] ([facility]). If you have questions about a medical condition or this instruction, always ask your healthcare professional. [address] any warranty or liability for your use of this information.

## 2022-05-09 NOTE — PROGRESS NOTES
Medicare Annual Wellness Visit    Dez Shah is here for Medicare AWV and Diabetes    Assessment & Plan   Type 2 diabetes mellitus with diabetic polyneuropathy, with long-term current use of insulin (HCC)  -     POCT glycosylated hemoglobin (Hb A1C)  -     POCT microalbumin  -      DIABETES FOOT EXAM  -     metFORMIN (GLUCOPHAGE) 500 MG tablet; TAKE 2 TABLETS TWICE A DAY WITH MEALS, Disp-360 tablet, R-3Normal  -     External Referral To Pain Clinic  Essential hypertension  Benign prostatic hyperplasia with weak urinary stream  -     finasteride (PROSCAR) 5 MG tablet; TAKE 1 TABLET DAILY, Disp-90 tablet, R-3Normal  Medicare annual wellness visit, subsequent  Urinary frequency  -     POCT Urinalysis No Micro (Auto)      Recommendations for Preventive Services Due: see orders and patient instructions/AVS.  Recommended screening schedule for the next 5-10 years is provided to the patient in written form: see Patient Instructions/AVS.     Return for Medicare Annual Wellness Visit in 1 year. Subjective   The following acute and/or chronic problems were also addressed today:     He is currently doing pool therapy for right knee. When he was doing rehab after hip surgery then the knee started to hurt. He is having some abnormal feeling when urinating. HgA1c = 7.5 -  Blood sugars have been 120-140's  No episodes of blood sugar too high or too low. He had a friend who went [place] in [facility] and had electrical cell therapy. Patient's complete Health Risk Assessment and screening values have been reviewed and are found in Flowsheets. The following problems were reviewed today and where indicated follow up appointments were made and/or referrals ordered.     Positive Risk Factor Screenings with Interventions:               General Health and ACP:  General  In general, how would you say your health is?: Good  In the past 7 days, have you experienced any of the following: New or Increased Pain, New or Increased Fatigue, Loneliness, Social Isolation, Stress or Anger?: No  Do you get the social and emotional support that you need?: Yes  Do you have a Living Will?: Yes    Advance Directives     Power of Mikayla Gomez Will ACP-Advance Directive ACP-Power of     Not on File Not on File Not on File Not on File      General Health Risk Interventions:  · Patient feels he is in good health has good support and overall doing well. Health Habits/Nutrition:     Physical Activity: Insufficiently Active    Days of Exercise per Week: 4 days    Minutes of Exercise per Session: 30 min     Have you lost any weight without trying in the past 3 months?: No     Have you seen the dentist within the past year?: Yes    Health Habits/Nutrition Interventions:  -Patient agrees to work towards healthy weight with healthy diet and increase physical activity. He is push mowing his lawn. He golf's weekly. And he does physical therapy twice weekly. Objective   Vitals:    05/09/22 0941   BP: 110/68   Pulse: 125   SpO2: 96%   Weight: 262 lb 4.8 oz (119 kg)      Body mass index is 36.58 kg/m².         General Appearance: alert and oriented to person, place and time, well-developed and well-nourished, in no acute distress  Skin: warm and dry, no rash or erythema  Head: normocephalic and atraumatic  Eyes: pupils equal, round, and reactive to light, extraocular eye movements intact, conjunctivae normal  ENT: tympanic membrane, external ear and ear canal normal bilaterally, oropharynx clear and moist with normal mucous membranes  Neck: neck supple and non tender without mass, no thyromegaly or thyroid nodules, no cervical lymphadenopathy   Pulmonary/Chest: clear to auscultation bilaterally- no wheezes, rales or rhonchi, normal air movement, no respiratory distress  Cardiovascular: normal rate, normal S1 and S2, no gallops and no carotid bruits  Abdomen: soft, non-tender, non-distended, normal bowel sounds, no masses or organomegaly  Extremities: no cyanosis, no clubbing, no edema and venous stasis dermatosis noted  Musculoskeletal: Bilateral hip pain and decreased range of motion. Right knee pain with activity. Neurologic: gait and coordination normal, speech normal and light touch sensation 0 out of 10 on bilateral feet. Allergies   Allergen Reactions    Seasonal Anaphylaxis    Zantac [Ranitidine Hcl] Shortness Of Breath     Chest tightness    Zantac [Ranitidine] Other (See Comments)    Shellfish Allergy Other (See Comments)     Chest tightness, sometimes sever diarhea    Shrimp (Diagnostic) Nausea And Vomiting     Prior to Visit Medications    Medication Sig Taking?  Authorizing Provider   naproxen (NAPROSYN) 250 MG tablet Take 200 mg by mouth 2 times daily (with meals) Yes Historical Provider, MD   metFORMIN (GLUCOPHAGE) 500 MG tablet TAKE 2 TABLETS TWICE A DAY WITH MEALS Yes KADIE Cagle CNP   finasteride (PROSCAR) 5 MG tablet TAKE 1 TABLET DAILY Yes KADIE Cagle CNP   irbesartan (AVAPRO) 150 MG tablet Take 1 tablet by mouth nightly Yes KADIE Cagle CNP   NOVOFINE PEN NEEDLE 32G X 6 MM MISC USE AS DIRECTED TO INJECT Yes KADIE Cagle CNP   omeprazole (PRILOSEC) 40 MG delayed release capsule TAKE 1 CAPSULE DAILY Yes KADIE Cagle CNP   simvastatin (ZOCOR) 20 MG tablet TAKE 1 TABLET NIGHTLY Yes KADIE Cagle CNP   tamsulosin (FLOMAX) 0.4 MG capsule TAKE 1 CAPSULE DAILY Yes KADIE Cagle CNP   torsemide (DEMADEX) 20 MG tablet Take 1 tablet by mouth daily Yes KADIE Cagle CNP   Alpha-Lipoic Acid 300 MG CAPS Take 300 mg by mouth Yes Historical Provider, MD   Insulin Glargine, 2 Unit Dial, (TOUJEO MAX SOLOSTAR) 300 UNIT/ML SOPN Inject 26 Units into the skin daily Yes KADIE Cagle CNP   Lancets MISC 1 each by Does not apply route 2 times daily Yes SAVAGE Lauren   hyoscyamine (LEVSIN) 125 MCG tablet Take 0.125 mg by mouth every 4 hours as needed Yes Historical Provider, MD   [id number] Mckayla Jaimes) [phone] MG CAPS capsule Take by mouth 2 times daily  Yes Historical Provider, MD Hair (Including outside providers/suppliers regularly involved in providing care):   Patient Care Team:  KADIE Lomeli CNP as PCP - General (Family Nurse Practitioner)  [place] KADIE Johnson CNP as PCP - [facility] Empaneled Provider    Reviewed and updated this visit:  Tobacco  Allergies  Meds  Problems  Med Hx  Surg Hx  Soc Hx  Fam Hx                   Obesity Counseling: Assessed behavioral health risks and factors affecting choice of behavior. Suggested weight control approaches, including dietary changes behavioral modification and follow up plan. Provided educational and support documentation.   Time spent (minutes): 5

## 2022-08-15 ENCOUNTER — OFFICE VISIT (OUTPATIENT)
Dept: PRIMARY CARE CLINIC | Age: 67
End: 2022-08-15
Payer: COMMERCIAL

## 2022-08-15 VITALS
OXYGEN SATURATION: 97 % | HEIGHT: 71 IN | WEIGHT: 258 LBS | SYSTOLIC BLOOD PRESSURE: 132 MMHG | BODY MASS INDEX: 36.12 KG/M2 | HEART RATE: 111 BPM | DIASTOLIC BLOOD PRESSURE: 84 MMHG

## 2022-08-15 DIAGNOSIS — R26.89 POOR BALANCE: ICD-10-CM

## 2022-08-15 DIAGNOSIS — Z79.4 TYPE 2 DIABETES MELLITUS WITH DIABETIC POLYNEUROPATHY, WITH LONG-TERM CURRENT USE OF INSULIN (HCC): Primary | ICD-10-CM

## 2022-08-15 DIAGNOSIS — K21.9 GASTROESOPHAGEAL REFLUX DISEASE WITHOUT ESOPHAGITIS: ICD-10-CM

## 2022-08-15 DIAGNOSIS — E11.42 TYPE 2 DIABETES MELLITUS WITH DIABETIC POLYNEUROPATHY, WITH LONG-TERM CURRENT USE OF INSULIN (HCC): Primary | ICD-10-CM

## 2022-08-15 LAB — HBA1C MFR BLD: 7 %

## 2022-08-15 PROCEDURE — 1123F ACP DISCUSS/DSCN MKR DOCD: CPT | Performed by: NURSE PRACTITIONER

## 2022-08-15 PROCEDURE — 99214 OFFICE O/P EST MOD 30 MIN: CPT | Performed by: NURSE PRACTITIONER

## 2022-08-15 PROCEDURE — 83036 HEMOGLOBIN GLYCOSYLATED A1C: CPT | Performed by: NURSE PRACTITIONER

## 2022-08-15 PROCEDURE — 3051F HG A1C>EQUAL 7.0%<8.0%: CPT | Performed by: NURSE PRACTITIONER

## 2022-08-15 ASSESSMENT — ENCOUNTER SYMPTOMS
NAUSEA: 0
PHOTOPHOBIA: 0
SHORTNESS OF BREATH: 1
SINUS PAIN: 0
VOICE CHANGE: 0
SORE THROAT: 0
DIARRHEA: 1
VOMITING: 0
ABDOMINAL DISTENTION: 0
TROUBLE SWALLOWING: 0
BACK PAIN: 0

## 2022-08-15 NOTE — PROGRESS NOTES
[address] [facility]  [facility]  [facility] [address] [place] [address]  Dept: [phone]    Tanisha Smith is a 75 y.o. male Established patient, who presents today for his medical conditions/complaints as noted below. Chief Complaint   Patient presents with    Diabetes       HPI:     Diabetes  Pertinent negatives for hypoglycemia include no confusion, dizziness or headaches. Pertinent negatives for diabetes include no chest pain and no fatigue. Right middle finger pain sharp shooting pain that comes and goes. ROM intact no  erythema and swelling or tenderness  Went to podiatrist but doesn't do neuropathy management   States neuropathy is a little worse. Pain improved after surgery  Has been feeling good. Is worried about his neuropathy and has trying to find a place to help him to help but his insurance won't cover  Noticed his balance is off but no history of falls. Taking omeprazole about 4 times per day. He tries to watch diet.     Reviewed prior notes:  podiatrist  and pulmonologist   Reviewed previous:        LDL Calculated (mg/dL)   Date Value   11/02/2021 52   12/13/2019 52   12/02/2016 46       (goal LDL is <100)   BUN (mg/dL)   Date Value   08/05/2019 22     Hemoglobin A1C (%)   Date Value   08/15/2022 7.0     BP Readings from Last 3 Encounters:   08/15/22 132/84   05/09/22 110/68   02/08/22 120/68          (goal 120/80)    Past Medical History:   Diagnosis Date    GERD (gastroesophageal reflux disease)     Hyperlipidemia     Hypertension     Type 2 diabetes mellitus without complication ([facility] [address]       Past Surgical History:   Procedure Laterality Date    ANTERIOR CRUCIATE LIGAMENT REPAIR      NECK SURGERY      REFRACTIVE SURGERY Bilateral 2013       Family History   Problem Relation Age of Onset    Diabetes Mother     Cancer Father        Social History     Tobacco Use    Smoking status: Never    Smokeless tobacco: Never   Substance Use Topics    Alcohol use: Yes      Current Outpatient Medications   Medication Sig Dispense Refill    blood glucose test strips (ASCENSIA AUTODISC VI;ONE TOUCH ULTRA TEST VI) strip Use with associated glucose meter. Use q day 100 strip 11    naproxen (NAPROSYN) 250 MG tablet Take 200 mg by mouth 2 times daily (with meals)      metFORMIN (GLUCOPHAGE) 500 MG tablet TAKE 2 TABLETS TWICE A DAY WITH MEALS 360 tablet 3    finasteride (PROSCAR) 5 MG tablet TAKE 1 TABLET DAILY 90 tablet 3    irbesartan (AVAPRO) 150 MG tablet Take 1 tablet by mouth nightly 90 tablet 3    NOVOFINE PEN NEEDLE 32G X 6 MM MISC USE AS DIRECTED TO INJECT 100 each 3    omeprazole (PRILOSEC) 40 MG delayed release capsule TAKE 1 CAPSULE DAILY 90 capsule 3    simvastatin (ZOCOR) 20 MG tablet TAKE 1 TABLET NIGHTLY 90 tablet 3    tamsulosin (FLOMAX) 0.4 MG capsule TAKE 1 CAPSULE DAILY 90 capsule 3    torsemide (DEMADEX) 20 MG tablet Take 1 tablet by mouth daily 90 tablet 2    Alpha-Lipoic Acid 300 MG CAPS Take 300 mg by mouth      Insulin Glargine, 2 Unit Dial, (TOUJEO MAX SOLOSTAR) 300 UNIT/ML SOPN Inject 26 Units into the skin daily 6 pen 3    Lancets MISC 1 each by Does not apply route 2 times daily 100 each 5    hyoscyamine (ANASPAZ;LEVSIN) 125 MCG tablet Take 0.125 mg by mouth every 4 hours as needed      L-methylfolate-B6-B12 (METANX) 3-62.874-0-59 MG CAPS capsule Take by mouth 2 times daily        No current facility-administered medications for this visit.      Allergies   Allergen Reactions    Levonorgestrel-Ethinyl Estrad Anaphylaxis    Seasonal Anaphylaxis    Zantac [Ranitidine Hcl] Shortness Of Breath     Chest tightness    Shellfish Allergy Other (See Comments)     Chest tightness, sometimes sever diarhea    Zantac [Ranitidine] Other (See Comments)    Shrimp (Diagnostic) Nausea And Vomiting    Shrimp Extract Allergy Skin Test Nausea And Vomiting       Health Maintenance   Topic Date Due    Flu vaccine (1) 09/01/2022    COVID-19 Vaccine (4 - Booster for [place] series) 10/09/2022    Lipids  11/02/2022    Diabetic microalbuminuria test  05/09/2023    Depression Screen  05/09/2023    Annual Wellness Visit (AWV)  05/10/2023    Diabetic retinal exam  05/23/2023    Diabetic foot exam  08/01/2023    A1C test (Diabetic or Prediabetic)  08/15/2023    DTaP/Tdap/Td vaccine (2 - Td or Tdap) 10/08/2027    Colorectal Cancer Screen  10/17/2028    Shingles vaccine  Completed    Pneumococcal 65+ years Vaccine  Completed    Hepatitis C screen  Completed    Hepatitis A vaccine  Aged Out    Hib vaccine  Aged Out    Meningococcal (ACWY) vaccine  Aged Out       Subjective:      Review of Systems   Constitutional:  Negative for activity change, appetite change, chills, diaphoresis, fatigue, fever and unexpected weight change. HENT:  Negative for congestion, dental problem, ear discharge, hearing loss, mouth sores, sinus pain, sneezing, sore throat, tinnitus, trouble swallowing and voice change. Eyes:  Negative for photophobia and visual disturbance. Respiratory:  Positive for shortness of breath. After a few days of his CPAP he notices a change, had a recent  chest x-ray and it was clear. He is suppose to get a new one. Cardiovascular:  Negative for chest pain, palpitations and leg swelling. Gastrointestinal:  Positive for diarrhea. Negative for abdominal distention, nausea and vomiting. Genitourinary:  Negative for decreased urine volume, difficulty urinating and frequency. Musculoskeletal:  Positive for gait problem. Negative for arthralgias, back pain, joint swelling, neck pain and neck stiffness. Dm neuropathy    Neurological:  Negative for dizziness, light-headedness and headaches. Psychiatric/Behavioral:  Negative for agitation, behavioral problems, confusion, decreased concentration, dysphoric mood, self-injury, sleep disturbance and suicidal ideas.       Objective:     /84   Pulse (!) 111   Ht 5' 11\" (1.803 m)   Wt 258 lb (117 kg)   SpO2 97% BMI 35.98 kg/m²   Physical Exam  Vitals and nursing note reviewed. Constitutional:       Appearance: Normal appearance. He is normal weight. HENT:      Head: Normocephalic. Nose: Nose normal.   Eyes:      Pupils: Pupils are equal, round, and reactive to light. Cardiovascular:      Rate and Rhythm: Normal rate and regular rhythm. Pulses: Normal pulses. Heart sounds: Normal heart sounds. Pulmonary:      Effort: Pulmonary effort is normal.      Breath sounds: Normal breath sounds. Abdominal:      General: Abdomen is flat. Bowel sounds are normal.      Palpations: Abdomen is soft. Musculoskeletal:         General: Normal range of motion. Cervical back: Normal range of motion. Skin:     General: Skin is warm. Capillary Refill: Capillary refill takes more than 3 seconds. Neurological:      General: No focal deficit present. Mental Status: He is alert and oriented to person, place, and time. Coordination: Romberg sign positive. Comments: Poor balance with eyes closed   Psychiatric:         Mood and Affect: Mood normal.         Behavior: Behavior normal.         Thought Content: Thought content normal.         Judgment: Judgment normal.       Assessment/Plan:   1. Type 2 diabetes mellitus with diabetic polyneuropathy, with long-term current use of insulin ([facility])  -     POCT glycosylated hemoglobin (Hb A1C)  -     blood glucose test strips (ASCENSIA AUTODISC VI;ONE TOUCH ULTRA TEST VI) strip; Disp-100 strip, R-11, PrintUse with associated glucose meter. Use q day  2. Gastroesophageal reflux disease without esophagitis  3. Poor balance  -     External Referral To Physical Therapy     Physical therapy for balance  Continue improved diet for diabetes    Return in 3 months (on 11/15/2022) for diabetes.   Data Unavailable     Orders Placed This Encounter   Procedures    External Referral To Physical Therapy     Referral Priority:   Routine     Referral Type:   Eval and Treat Referral Reason:   Specialty Services Required     Requested Specialty:   Physical Therapist     Number of Visits Requested:   1    POCT glycosylated hemoglobin (Hb A1C)     Orders Placed This Encounter   Medications    blood glucose test strips (ASCENSIA AUTODISC VI;ONE TOUCH ULTRA TEST VI) strip     Sig: Use with associated glucose meter. Use q day     Dispense:  100 strip     Refill:  11       Patient given educational materials - see patient instructions. Discussed use, benefit, and side effects of prescribed medications. All patient questions answered. Pt voiced understanding. Reviewed health maintenance. Instructed to continue current medications, diet and exercise. Patient agreed with treatment plan. Follow up as directed.      Electronically signed by KADIE Brumfield CNP on 8/15/2022 at 10:33 AM

## 2022-10-23 DIAGNOSIS — R60.0 LOCALIZED EDEMA: ICD-10-CM

## 2022-10-24 RX ORDER — TORSEMIDE 20 MG/1
TABLET ORAL
Qty: 180 TABLET | Refills: 3 | Status: SHIPPED | OUTPATIENT
Start: 2022-10-24

## 2022-10-31 ENCOUNTER — TELEPHONE (OUTPATIENT)
Dept: PRIMARY CARE CLINIC | Age: 67
End: 2022-10-31

## 2022-10-31 DIAGNOSIS — G57.93 NEUROPATHY OF BOTH FEET: Primary | ICD-10-CM

## 2022-10-31 NOTE — TELEPHONE ENCOUNTER
Pt saw the Neurologist and he thinks that P.T. in the pool would be helpful for his neuropathy in his feet.  If he gets referral from you, will not have to start all over with eval/etc.     Would like it faxed to [facility] in [place] @ [phone]

## 2022-10-31 NOTE — TELEPHONE ENCOUNTER
Order entered for referral.  Please send order to [facility] per his request.

## 2022-11-03 ENCOUNTER — HOSPITAL ENCOUNTER (OUTPATIENT)
Age: 67
Discharge: HOME | End: 2022-11-05
Payer: COMMERCIAL

## 2022-11-03 ENCOUNTER — TELEPHONE (OUTPATIENT)
Dept: PRIMARY CARE CLINIC | Age: 67
End: 2022-11-03

## 2022-11-03 ENCOUNTER — HOSPITAL ENCOUNTER (OUTPATIENT)
Dept: GENERAL RADIOLOGY | Age: 67
Discharge: HOME | End: 2022-11-05
Payer: COMMERCIAL

## 2022-11-03 DIAGNOSIS — M79.89 FINGER SWELLING: ICD-10-CM

## 2022-11-03 DIAGNOSIS — M79.89 FINGER SWELLING: Primary | ICD-10-CM

## 2022-11-03 PROCEDURE — 73140 X-RAY EXAM OF FINGER(S): CPT

## 2022-11-03 NOTE — TELEPHONE ENCOUNTER
Patient had a fall 3 weeks ago - does not recall injuring finger - states that he has swelling and pain to his knuckle - left pinky finger. Has been using ice and voltaren - denies bruising. Patient is asking what else he can be doing.

## 2022-11-08 NOTE — TELEPHONE ENCOUNTER
Outgoing call placed to Irvin.  I explained that  was not able fully remove all of the stone at the procedure yesterday. He will need a 2nd procedure.  He did not remember what  told him yesterday. He was unaware that he would need a 2nd procedure.  He expressed understanding and agreed to schedule.  He agreed to 12/5/22 at 8am - 6:30am arrival time to [facility].      To be booked with [facility] order placed  Booked in [facility]  H&P scheduled with  on 11/28/22   ordered  [facility] confirmation # [id number] - nick  Follow-up - TBD  Instructions reviewed with patient & mailed to home    Task completed     Patient notified.

## 2022-11-30 DIAGNOSIS — Z79.4 TYPE 2 DIABETES MELLITUS WITH DIABETIC POLYNEUROPATHY, WITH LONG-TERM CURRENT USE OF INSULIN (HCC): ICD-10-CM

## 2022-11-30 DIAGNOSIS — E11.42 TYPE 2 DIABETES MELLITUS WITH DIABETIC POLYNEUROPATHY, WITH LONG-TERM CURRENT USE OF INSULIN (HCC): ICD-10-CM

## 2022-12-02 ENCOUNTER — TELEPHONE (OUTPATIENT)
Dept: PRIMARY CARE CLINIC | Age: 67
End: 2022-12-02

## 2022-12-02 NOTE — TELEPHONE ENCOUNTER
----- Message from Theron Fallon sent at 12/2/2022  8:42 AM EST -----  Subject: Message to Provider    QUESTIONS  Information for Provider? PT is calling inquiring if office can call him   anitibiocs in for a sore throat, congestion, runny nose . PT will like to   know if he may speak with someone to assist him to help with his symptoms,   PT will keep appt for Monday, If pt. is not doing well by upcoming appt. Pt will call in office to cancel appt  ---------------------------------------------------------------------------  --------------  Melinda CHA  [phone]; OK to leave message on voicemail  ---------------------------------------------------------------------------  --------------  SCRIPT ANSWERS  Relationship to Patient?  Self

## 2022-12-05 ENCOUNTER — OFFICE VISIT (OUTPATIENT)
Dept: PRIMARY CARE CLINIC | Age: 67
End: 2022-12-05
Payer: COMMERCIAL

## 2022-12-05 VITALS
OXYGEN SATURATION: 99 % | HEART RATE: 88 BPM | BODY MASS INDEX: 35.98 KG/M2 | DIASTOLIC BLOOD PRESSURE: 62 MMHG | WEIGHT: 258 LBS | SYSTOLIC BLOOD PRESSURE: 130 MMHG

## 2022-12-05 DIAGNOSIS — I10 ESSENTIAL HYPERTENSION: ICD-10-CM

## 2022-12-05 DIAGNOSIS — E11.42 TYPE 2 DIABETES MELLITUS WITH DIABETIC POLYNEUROPATHY, WITH LONG-TERM CURRENT USE OF INSULIN (HCC): Primary | ICD-10-CM

## 2022-12-05 DIAGNOSIS — Z79.4 TYPE 2 DIABETES MELLITUS WITH DIABETIC POLYNEUROPATHY, WITH LONG-TERM CURRENT USE OF INSULIN (HCC): Primary | ICD-10-CM

## 2022-12-05 DIAGNOSIS — J06.9 VIRAL UPPER RESPIRATORY TRACT INFECTION: ICD-10-CM

## 2022-12-05 LAB — HBA1C MFR BLD: 7.1 %

## 2022-12-05 PROCEDURE — 3074F SYST BP LT 130 MM HG: CPT | Performed by: NURSE PRACTITIONER

## 2022-12-05 PROCEDURE — 1123F ACP DISCUSS/DSCN MKR DOCD: CPT | Performed by: NURSE PRACTITIONER

## 2022-12-05 PROCEDURE — 3051F HG A1C>EQUAL 7.0%<8.0%: CPT | Performed by: NURSE PRACTITIONER

## 2022-12-05 PROCEDURE — 99214 OFFICE O/P EST MOD 30 MIN: CPT | Performed by: NURSE PRACTITIONER

## 2022-12-05 PROCEDURE — 3078F DIAST BP <80 MM HG: CPT | Performed by: NURSE PRACTITIONER

## 2022-12-05 PROCEDURE — 83036 HEMOGLOBIN GLYCOSYLATED A1C: CPT | Performed by: NURSE PRACTITIONER

## 2022-12-05 RX ORDER — INSULIN GLARGINE 300 U/ML
28 INJECTION, SOLUTION SUBCUTANEOUS DAILY
Qty: 6 ADJUSTABLE DOSE PRE-FILLED PEN SYRINGE | Refills: 2
Start: 2022-12-05

## 2022-12-05 ASSESSMENT — PATIENT HEALTH QUESTIONNAIRE - PHQ9
SUM OF ALL RESPONSES TO PHQ QUESTIONS 1-9: 0
SUM OF ALL RESPONSES TO PHQ9 QUESTIONS 1 & 2: 0
SUM OF ALL RESPONSES TO PHQ QUESTIONS 1-9: 0
1. LITTLE INTEREST OR PLEASURE IN DOING THINGS: 0
SUM OF ALL RESPONSES TO PHQ QUESTIONS 1-9: 0
SUM OF ALL RESPONSES TO PHQ QUESTIONS 1-9: 0
2. FEELING DOWN, DEPRESSED OR HOPELESS: 0

## 2022-12-05 ASSESSMENT — ENCOUNTER SYMPTOMS
NAUSEA: 0
BACK PAIN: 1
VOMITING: 0
SORE THROAT: 1
PHOTOPHOBIA: 0
COUGH: 1

## 2022-12-05 NOTE — PROGRESS NOTES
[address] [address] PRIMARY CARE  [facility] [address]  [facility] [address] [place] [address]  Dept: [phone]    Esperanza Adams is a 75 y.o. male Established patient, who presents today for his medical conditions/complaints as noted below. Chief Complaint   Patient presents with    Chest Congestion     Started Friday - has a \"scratchy\" throat since Wednesday. Diabetes       HPI:     Chest Congestion  The current episode started in the past 7 days. The problem occurs daily. The problem has been gradually improving. Associated symptoms include chest pain, congestion, coughing (mild production), fatigue, myalgias and a sore throat. Pertinent negatives include no chills, fever, headaches, nausea, rash, vomiting or weakness. The symptoms are aggravated by coughing. Treatments tried: dayquil, nightquil. The treatment provided moderate relief. Diabetes  He presents for his follow-up diabetic visit. He has type 2 diabetes mellitus. Onset time: years. His disease course has been stable. There are no hypoglycemic associated symptoms. Pertinent negatives for hypoglycemia include no confusion, dizziness, headaches, mood changes, nervousness/anxiousness, sleepiness, sweats or tremors. Associated symptoms include chest pain, fatigue and polyuria. Pertinent negatives for diabetes include no polydipsia, no polyphagia and no weakness. There are no hypoglycemic complications. Symptoms are stable. There are no diabetic complications. Risk factors for coronary artery disease include diabetes mellitus, male sex and obesity. Current diabetic treatment includes insulin injections. His weight is stable. He is following a generally healthy diet. When asked about meal planning, he reported none. Exercise: pool therapy. His breakfast blood glucose range is generally 130-140 mg/dl. An ACE inhibitor/angiotensin II receptor blocker is being taken.    HgA1C = 7.1    Reviewed prior notes: None   Reviewed previous: LDL Calculated (mg/dL)   Date Value   11/02/2021 52   12/13/2019 52   12/02/2016 46       (goal LDL is <100)   BUN (mg/dL)   Date Value   08/05/2019 22     Hemoglobin A1C (%)   Date Value   12/05/2022 7.1     BP Readings from Last 3 Encounters:   12/05/22 130/62   08/15/22 132/84   05/09/22 110/68          (goal 120/80)    Past Medical History:   Diagnosis Date    GERD (gastroesophageal reflux disease)     Hyperlipidemia     Hypertension     Type 2 diabetes mellitus without complication (HCC)       Past Surgical History:   Procedure Laterality Date    ANTERIOR CRUCIATE LIGAMENT REPAIR      NECK SURGERY      REFRACTIVE SURGERY Bilateral 2013       Family History   Problem Relation Age of Onset    Diabetes Mother     Cancer Father        Social History     Tobacco Use    Smoking status: Never    Smokeless tobacco: Never   Substance Use Topics    Alcohol use: Yes      Current Outpatient Medications   Medication Sig Dispense Refill    metFORMIN (GLUCOPHAGE) 500 MG tablet TAKE 2 TABLETS TWICE A DAY WITH MEALS 360 tablet 3    torsemide (DEMADEX) 20 MG tablet TAKE 1 TABLET TWICE A  tablet 3    blood glucose test strips (ASCENSIA AUTODISC VI;ONE TOUCH ULTRA TEST VI) strip Use with associated glucose meter.   Use q day 100 strip 11    naproxen (NAPROSYN) 250 MG tablet Take 200 mg by mouth 2 times daily (with meals)      finasteride (PROSCAR) 5 MG tablet TAKE 1 TABLET DAILY 90 tablet 3    irbesartan (AVAPRO) 150 MG tablet Take 1 tablet by mouth nightly 90 tablet 3    NOVOFINE PEN NEEDLE 32G X 6 MM MISC USE AS DIRECTED TO INJECT 100 each 3    omeprazole (PRILOSEC) 40 MG delayed release capsule TAKE 1 CAPSULE DAILY 90 capsule 3    simvastatin (ZOCOR) 20 MG tablet TAKE 1 TABLET NIGHTLY 90 tablet 3    tamsulosin (FLOMAX) 0.4 MG capsule TAKE 1 CAPSULE DAILY 90 capsule 3    Alpha-Lipoic Acid 300 MG CAPS Take 300 mg by mouth      Insulin Glargine, 2 Unit Dial, (TOUJEO MAX SOLOSTAR) 300 UNIT/ML SOPN Inject 26 Units into the skin daily 6 pen 3    Lancets MISC 1 each by Does not apply route 2 times daily 100 each 5    hyoscyamine (ANASPAZ;LEVSIN) 125 MCG tablet Take 0.125 mg by mouth every 4 hours as needed      L-methylfolate-B6-B12 (METANX) 3-90.314-2-35 MG CAPS capsule Take by mouth 2 times daily  (Patient not taking: Reported on 12/5/2022)       No current facility-administered medications for this visit. Allergies   Allergen Reactions    Levonorgestrel-Ethinyl Estrad Anaphylaxis    Seasonal Anaphylaxis    Zantac [Ranitidine Hcl] Shortness Of Breath     Chest tightness    Shellfish Allergy Other (See Comments)     Chest tightness, sometimes sever diarhea    Zantac [Ranitidine] Other (See Comments)    Shrimp (Diagnostic) Nausea And Vomiting    Shrimp Extract Allergy Skin Test Nausea And Vomiting       Health Maintenance   Topic Date Due    Lipids  11/02/2022    Diabetic microalbuminuria test  05/09/2023    Depression Screen  05/09/2023    Annual Wellness Visit (AWV)  05/10/2023    Diabetic retinal exam  05/23/2023    Diabetic foot exam  08/01/2023    A1C test (Diabetic or Prediabetic)  12/05/2023    DTaP/Tdap/Td vaccine (2 - Td or Tdap) 10/08/2027    Colorectal Cancer Screen  10/17/2028    Flu vaccine  Completed    Shingles vaccine  Completed    Pneumococcal 65+ years Vaccine  Completed    COVID-19 Vaccine  Completed    Hepatitis C screen  Completed    Hepatitis A vaccine  Aged Out    Hib vaccine  Aged Out    Meningococcal (ACWY) vaccine  Aged Out       Subjective:      Review of Systems   Constitutional:  Positive for fatigue. Negative for chills and fever. HENT:  Positive for congestion and sore throat. Eyes:  Negative for photophobia and visual disturbance. Respiratory:  Positive for cough (mild production). Cardiovascular:  Positive for chest pain. Gastrointestinal:  Negative for nausea and vomiting. Endocrine: Positive for polyuria. Negative for polydipsia and polyphagia.    Genitourinary:  Negative for difficulty urinating and dysuria. Musculoskeletal:  Positive for back pain and myalgias. Skin:  Negative for rash. Neurological:  Negative for dizziness, tremors, weakness and headaches. Psychiatric/Behavioral:  Negative for confusion. The patient is not nervous/anxious. Objective:     /62   Pulse 88   Wt 258 lb (117 kg)   SpO2 99%   BMI 35.98 kg/m²   Physical Exam  Vitals and nursing note reviewed. Constitutional:       General: He is not in acute distress. Appearance: He is well-developed. He is not ill-appearing. HENT:      Head: Normocephalic and atraumatic. Right Ear: Tympanic membrane, ear canal and external ear normal.      Left Ear: Tympanic membrane, ear canal and external ear normal.      Nose:      Right Sinus: No maxillary sinus tenderness or frontal sinus tenderness. Left Sinus: No maxillary sinus tenderness or frontal sinus tenderness. Eyes:      General: No scleral icterus. Right eye: No discharge. Left eye: No discharge. Conjunctiva/sclera: Conjunctivae normal.   Neck:      Thyroid: No thyromegaly. Trachea: No tracheal deviation. Cardiovascular:      Rate and Rhythm: Normal rate and regular rhythm. Heart sounds: Normal heart sounds. Pulmonary:      Effort: Pulmonary effort is normal. No respiratory distress. Breath sounds: Normal breath sounds. No wheezing. Abdominal:      General: Bowel sounds are normal.      Palpations: Abdomen is soft. Tenderness: There is no abdominal tenderness. Musculoskeletal:      Right lower le+ Pitting Edema present. Left lower le+ Pitting Edema present. Lymphadenopathy:      Cervical: No cervical adenopathy. Skin:     General: Skin is warm. Findings: No rash. Neurological:      Mental Status: He is alert and oriented to person, place, and time.    Psychiatric:         Mood and Affect: Mood normal.         Behavior: Behavior normal.         Thought Content: Thought content normal.       Assessment/Plan:   1. Type 2 diabetes mellitus with diabetic polyneuropathy, with long-term current use of insulin (HCC)  -     POCT glycosylated hemoglobin (Hb A1C)  The following orders have not been finalized:  -     Insulin Glargine, 2 Unit Dial, (TOUJEO MAX SOLOSTAR) 300 UNIT/ML SOPN  2. Viral upper respiratory tract infection  3. Essential hypertension     Continue current therapy  Continue current insulin glargine 28 units daily    Return in about 4 months (around 4/5/2023) for diabetes. Data Unavailable     Orders Placed This Encounter   Procedures    POCT glycosylated hemoglobin (Hb A1C)     No orders of the defined types were placed in this encounter. Patient given educational materials - see patient instructions. Discussed use, benefit, and side effects of prescribed medications. All patient questions answered. Pt voiced understanding. Reviewed health maintenance. Instructed to continue current medications, diet and exercise. Patient agreed with treatment plan. Follow up as directed.      Electronically signed by KADIE Stevens CNP on 12/5/2022 at 10:16 AM

## 2023-01-17 ENCOUNTER — TELEPHONE (OUTPATIENT)
Dept: PRIMARY CARE CLINIC | Age: 68
End: 2023-01-17

## 2023-01-17 NOTE — TELEPHONE ENCOUNTER
Patient called stated he has been trying to get a glucometer and strips since August. He found out it has to go to [facility] and needs to be for [place]. Wrote order and faxed to Baljit per patient.  Fax [phone]

## 2023-01-21 DIAGNOSIS — E11.42 TYPE 2 DIABETES MELLITUS WITH DIABETIC POLYNEUROPATHY, WITH LONG-TERM CURRENT USE OF INSULIN (HCC): ICD-10-CM

## 2023-01-21 DIAGNOSIS — Z79.4 TYPE 2 DIABETES MELLITUS WITH DIABETIC POLYNEUROPATHY, WITH LONG-TERM CURRENT USE OF INSULIN (HCC): ICD-10-CM

## 2023-01-23 RX ORDER — INSULIN GLARGINE 300 U/ML
INJECTION, SOLUTION SUBCUTANEOUS
Qty: 18 ML | Refills: 1 | Status: SHIPPED | OUTPATIENT
Start: 2023-01-23

## 2023-01-24 RX ORDER — SIMVASTATIN 20 MG
TABLET ORAL
Qty: 90 TABLET | Refills: 3 | Status: SHIPPED | OUTPATIENT
Start: 2023-01-24

## 2023-02-03 RX ORDER — TAMSULOSIN HYDROCHLORIDE 0.4 MG/1
CAPSULE ORAL
Qty: 90 CAPSULE | Refills: 3 | Status: SHIPPED | OUTPATIENT
Start: 2023-02-03

## 2023-03-06 RX ORDER — OMEPRAZOLE 40 MG/1
CAPSULE, DELAYED RELEASE ORAL
Qty: 90 CAPSULE | Refills: 3 | Status: SHIPPED | OUTPATIENT
Start: 2023-03-06

## 2023-04-05 PROBLEM — E11.49 TYPE 2 DIABETES MELLITUS WITH NEUROLOGICAL MANIFESTATION (HCC): Status: ACTIVE | Noted: 2023-04-05

## 2023-04-05 RX ORDER — IRBESARTAN 150 MG/1
TABLET ORAL
Qty: 90 TABLET | Refills: 3 | Status: SHIPPED | OUTPATIENT
Start: 2023-04-05

## 2023-04-05 NOTE — TELEPHONE ENCOUNTER
LAST VISIT:   12/05/2022    Future Appointments   Date Time Provider Sheryl Lyles   4/10/2023  9:00 AM KADIE Woodall CNP [facility]i

## 2023-04-17 ENCOUNTER — OFFICE VISIT (OUTPATIENT)
Dept: PRIMARY CARE CLINIC | Age: 68
End: 2023-04-17
Payer: COMMERCIAL

## 2023-04-17 VITALS
SYSTOLIC BLOOD PRESSURE: 136 MMHG | DIASTOLIC BLOOD PRESSURE: 82 MMHG | HEART RATE: 116 BPM | HEIGHT: 71 IN | WEIGHT: 264.8 LBS | OXYGEN SATURATION: 98 % | BODY MASS INDEX: 37.07 KG/M2

## 2023-04-17 DIAGNOSIS — Z79.4 TYPE 2 DIABETES MELLITUS WITH DIABETIC POLYNEUROPATHY, WITH LONG-TERM CURRENT USE OF INSULIN (HCC): Primary | ICD-10-CM

## 2023-04-17 DIAGNOSIS — R20.9 BILATERAL COLD FEET: ICD-10-CM

## 2023-04-17 DIAGNOSIS — E08.59 DIABETES MELLITUS DUE TO UNDERLYING CONDITION WITH OTHER CIRCULATORY COMPLICATIONS (HCC): ICD-10-CM

## 2023-04-17 DIAGNOSIS — E11.42 TYPE 2 DIABETES MELLITUS WITH DIABETIC POLYNEUROPATHY, WITH LONG-TERM CURRENT USE OF INSULIN (HCC): Primary | ICD-10-CM

## 2023-04-17 PROCEDURE — 99213 OFFICE O/P EST LOW 20 MIN: CPT | Performed by: FAMILY MEDICINE

## 2023-04-17 PROCEDURE — 3075F SYST BP GE 130 - 139MM HG: CPT | Performed by: FAMILY MEDICINE

## 2023-04-17 PROCEDURE — 3079F DIAST BP 80-89 MM HG: CPT | Performed by: FAMILY MEDICINE

## 2023-04-17 PROCEDURE — 1123F ACP DISCUSS/DSCN MKR DOCD: CPT | Performed by: FAMILY MEDICINE

## 2023-04-17 ASSESSMENT — ENCOUNTER SYMPTOMS
EYE REDNESS: 0
WHEEZING: 0
NAUSEA: 0
EYE DISCHARGE: 0
SORE THROAT: 0
COUGH: 0
RHINORRHEA: 0
DIARRHEA: 0
SHORTNESS OF BREATH: 0
ABDOMINAL PAIN: 0
VOMITING: 0

## 2023-04-17 NOTE — PROGRESS NOTES
diet andexercise. Patient agreed with treatment plan. Follow up as directed.      Electronicallysigned by Jacqueline Sandhu MD on 4/17/2023 at 10:12 AM

## 2023-04-18 ENCOUNTER — TELEPHONE (OUTPATIENT)
Dept: PRIMARY CARE CLINIC | Age: 68
End: 2023-04-18

## 2023-04-18 NOTE — TELEPHONE ENCOUNTER
He called this morning said he had bad ringing in left ear really back at 4am that woke him. Joss Armas it is still ringing a little but not as bad. He did say when he was in the student had \"hit his eardrum with the otoscope. He also started a new vitamin recently Stored fat belly burner. I tried to schedule him to be seen because he said his ear still didn't feel right. But he couldn't come on Wednesday and nothing on Thursday at [place]. Please advise.

## 2023-04-28 ENCOUNTER — HOSPITAL ENCOUNTER (OUTPATIENT)
Dept: VASCULAR LAB | Age: 68
Discharge: HOME | End: 2023-04-28
Payer: COMMERCIAL

## 2023-04-28 DIAGNOSIS — R20.9 BILATERAL COLD FEET: ICD-10-CM

## 2023-04-28 DIAGNOSIS — E08.59 DIABETES MELLITUS DUE TO UNDERLYING CONDITION WITH OTHER CIRCULATORY COMPLICATIONS (HCC): ICD-10-CM

## 2023-04-28 PROCEDURE — 93923 UPR/LXTR ART STDY 3+ LVLS: CPT

## 2023-05-01 ENCOUNTER — TELEPHONE (OUTPATIENT)
Dept: PRIMARY CARE CLINIC | Age: 68
End: 2023-05-01

## 2023-05-01 DIAGNOSIS — R20.9 BILATERAL COLD FEET: Primary | ICD-10-CM

## 2023-05-01 DIAGNOSIS — I10 ESSENTIAL HYPERTENSION: ICD-10-CM

## 2023-05-01 RX ORDER — SIMVASTATIN 20 MG
TABLET ORAL
Qty: 90 TABLET | Refills: 3 | Status: SHIPPED | OUTPATIENT
Start: 2023-05-01

## 2023-05-01 NOTE — TELEPHONE ENCOUNTER
Pt calling for the results of his Arterial study. Scanned in under Imaging. Pt states that the nurse who read it, asked him when was his last ekg. I don't see one under cardiology.

## 2023-05-01 NOTE — TELEPHONE ENCOUNTER
Pt notified. Would like to get the EKG at [facility]. Was going to order it, but wanted to make sure correct dx is on it.  TERA

## 2023-05-01 NOTE — TELEPHONE ENCOUNTER
Bilateral lower extremity ABIs and PVRs (with toe pressures) were OK.   No vascular insufficiency at rest. If he would like to complete an EKG we can do it at his next appointment or I can order one for him to have completed at hospital.

## 2023-05-11 DIAGNOSIS — N40.1 BENIGN PROSTATIC HYPERPLASIA WITH WEAK URINARY STREAM: ICD-10-CM

## 2023-05-11 DIAGNOSIS — R39.12 BENIGN PROSTATIC HYPERPLASIA WITH WEAK URINARY STREAM: ICD-10-CM

## 2023-05-11 RX ORDER — FINASTERIDE 5 MG/1
TABLET, FILM COATED ORAL
Qty: 90 TABLET | Refills: 3 | Status: SHIPPED | OUTPATIENT
Start: 2023-05-11

## 2023-05-11 NOTE — TELEPHONE ENCOUNTER
LAST VISIT:   4/17/2023     Future Appointments   Date Time Provider Sheryl Lyles   7/24/2023 10:00 AM KADIE Gray - CNP [facility]IE Hernández

## 2023-07-19 ENCOUNTER — OFFICE VISIT (OUTPATIENT)
Dept: PRIMARY CARE CLINIC | Age: 68
End: 2023-07-19
Payer: COMMERCIAL

## 2023-07-19 VITALS
SYSTOLIC BLOOD PRESSURE: 120 MMHG | BODY MASS INDEX: 36.54 KG/M2 | WEIGHT: 262.3 LBS | DIASTOLIC BLOOD PRESSURE: 72 MMHG | HEART RATE: 103 BPM | OXYGEN SATURATION: 97 %

## 2023-07-19 DIAGNOSIS — G56.92 NEUROPATHY OF LEFT HAND: ICD-10-CM

## 2023-07-19 DIAGNOSIS — M79.642 LEFT HAND PAIN: Primary | ICD-10-CM

## 2023-07-19 PROCEDURE — 99213 OFFICE O/P EST LOW 20 MIN: CPT | Performed by: NURSE PRACTITIONER

## 2023-07-19 PROCEDURE — 1123F ACP DISCUSS/DSCN MKR DOCD: CPT | Performed by: NURSE PRACTITIONER

## 2023-07-19 PROCEDURE — 3078F DIAST BP <80 MM HG: CPT | Performed by: NURSE PRACTITIONER

## 2023-07-19 PROCEDURE — 3074F SYST BP LT 130 MM HG: CPT | Performed by: NURSE PRACTITIONER

## 2023-07-19 RX ORDER — PREDNISONE 20 MG/1
TABLET ORAL
COMMUNITY
Start: 2023-07-16

## 2023-07-19 RX ORDER — GABAPENTIN 100 MG/1
100 CAPSULE ORAL 3 TIMES DAILY
COMMUNITY
Start: 2023-05-26

## 2023-07-19 ASSESSMENT — ENCOUNTER SYMPTOMS
COUGH: 0
SHORTNESS OF BREATH: 0
CONSTIPATION: 0
DIARRHEA: 0

## 2023-07-19 NOTE — PROGRESS NOTES
[address] [facility] PRIMARY CARE  [facility]  [address] [facility] [address]  Dept: [phone]    Hubert Azar is a 70 y.o. male Established patient, who presents today for his medical conditions/complaints as noted below. Chief Complaint   Patient presents with    Hand Pain     Left hand pain/swelling after OT for neuroathy       HPI:     HPI   He finished up at OT last week and he did some testing where he had to squeeze as hard as he could. The next day his left had was swollen and had pain. He went to urgent care on Sunday and was diagnosed with pinched nerve. He is on day 4 of Steroid. He had x-ray at [facility] on Monday. He saw neurologist at [facility] in [place] and had an MRI of back. He saw Dr. Gareth Sawant pain management and he want to put a spinal stimulator from [employer],    Reviewed prior notes: Neurology   Reviewed previous:  Imaging    LDL Cholesterol (mg/dL)   Date Value   04/17/2023 69     LDL Calculated (mg/dL)   Date Value   11/02/2021 52   12/13/2019 52   12/02/2016 46       (goal LDL is <100)   AST (U/L)   Date Value   04/17/2023 18     ALT (U/L)   Date Value   04/17/2023 23     BUN (mg/dL)   Date Value   04/17/2023 25 (H)     Hemoglobin A1C (%)   Date Value   04/17/2023 7.4 (H)     BP Readings from Last 3 Encounters:   07/19/23 120/72   04/17/23 136/82   04/10/23 126/70          (goal 120/80)    Past Medical History:   Diagnosis Date    GERD (gastroesophageal reflux disease)     Hyperlipidemia     Hypertension     Type 2 diabetes mellitus without complication ([facility])       Past Surgical History:   Procedure Laterality Date    ANTERIOR CRUCIATE LIGAMENT REPAIR      NECK SURGERY      REFRACTIVE SURGERY Bilateral 2013       Family History   Problem Relation Age of Onset    Diabetes Mother     Cancer Father        Social History     Tobacco Use    Smoking status: Never    Smokeless tobacco: Never   Substance Use Topics    Alcohol use:  Yes

## 2023-07-23 SDOH — HEALTH STABILITY: PHYSICAL HEALTH: ON AVERAGE, HOW MANY DAYS PER WEEK DO YOU ENGAGE IN MODERATE TO STRENUOUS EXERCISE (LIKE A BRISK WALK)?: 2 DAYS

## 2023-07-23 SDOH — HEALTH STABILITY: PHYSICAL HEALTH: ON AVERAGE, HOW MANY MINUTES DO YOU ENGAGE IN EXERCISE AT THIS LEVEL?: 60 MIN

## 2023-07-23 ASSESSMENT — PATIENT HEALTH QUESTIONNAIRE - PHQ9
SUM OF ALL RESPONSES TO PHQ QUESTIONS 1-9: 0
1. LITTLE INTEREST OR PLEASURE IN DOING THINGS: 0
SUM OF ALL RESPONSES TO PHQ9 QUESTIONS 1 & 2: 0
2. FEELING DOWN, DEPRESSED OR HOPELESS: 0
SUM OF ALL RESPONSES TO PHQ QUESTIONS 1-9: 0

## 2023-07-23 ASSESSMENT — LIFESTYLE VARIABLES
HOW MANY STANDARD DRINKS CONTAINING ALCOHOL DO YOU HAVE ON A TYPICAL DAY: 1 OR 2
HOW OFTEN DO YOU HAVE SIX OR MORE DRINKS ON ONE OCCASION: 1
HOW OFTEN DO YOU HAVE A DRINK CONTAINING ALCOHOL: 2
HOW OFTEN DO YOU HAVE A DRINK CONTAINING ALCOHOL: MONTHLY OR LESS
HOW MANY STANDARD DRINKS CONTAINING ALCOHOL DO YOU HAVE ON A TYPICAL DAY: 1

## 2023-07-24 ENCOUNTER — OFFICE VISIT (OUTPATIENT)
Dept: PRIMARY CARE CLINIC | Age: 68
End: 2023-07-24
Payer: COMMERCIAL

## 2023-07-24 VITALS
HEART RATE: 103 BPM | DIASTOLIC BLOOD PRESSURE: 80 MMHG | WEIGHT: 257.8 LBS | HEIGHT: 71 IN | SYSTOLIC BLOOD PRESSURE: 130 MMHG | OXYGEN SATURATION: 96 % | BODY MASS INDEX: 36.09 KG/M2

## 2023-07-24 DIAGNOSIS — M79.642 LEFT HAND PAIN: ICD-10-CM

## 2023-07-24 DIAGNOSIS — Z00.00 MEDICARE ANNUAL WELLNESS VISIT, SUBSEQUENT: Primary | ICD-10-CM

## 2023-07-24 DIAGNOSIS — E11.42 TYPE 2 DIABETES MELLITUS WITH DIABETIC POLYNEUROPATHY, WITH LONG-TERM CURRENT USE OF INSULIN (HCC): ICD-10-CM

## 2023-07-24 DIAGNOSIS — Z79.4 TYPE 2 DIABETES MELLITUS WITH DIABETIC POLYNEUROPATHY, WITH LONG-TERM CURRENT USE OF INSULIN (HCC): ICD-10-CM

## 2023-07-24 PROBLEM — E78.00 HYPERCHOLESTEROLEMIA: Status: ACTIVE | Noted: 2023-07-24

## 2023-07-24 LAB — HBA1C MFR BLD: 7.4 %

## 2023-07-24 PROCEDURE — 83037 HB GLYCOSYLATED A1C HOME DEV: CPT | Performed by: NURSE PRACTITIONER

## 2023-07-24 PROCEDURE — 3051F HG A1C>EQUAL 7.0%<8.0%: CPT | Performed by: NURSE PRACTITIONER

## 2023-07-24 PROCEDURE — 3079F DIAST BP 80-89 MM HG: CPT | Performed by: NURSE PRACTITIONER

## 2023-07-24 PROCEDURE — 1123F ACP DISCUSS/DSCN MKR DOCD: CPT | Performed by: NURSE PRACTITIONER

## 2023-07-24 PROCEDURE — 3075F SYST BP GE 130 - 139MM HG: CPT | Performed by: NURSE PRACTITIONER

## 2023-07-24 PROCEDURE — G0439 PPPS, SUBSEQ VISIT: HCPCS | Performed by: NURSE PRACTITIONER

## 2023-07-24 RX ORDER — INSULIN GLARGINE 300 U/ML
30 INJECTION, SOLUTION SUBCUTANEOUS DAILY
Qty: 18 ML | Refills: 1 | Status: SHIPPED | OUTPATIENT
Start: 2023-07-24

## 2023-07-24 NOTE — PROGRESS NOTES
Medicare Annual Wellness Visit    Pranay Chu is here for Medicare AWV    Assessment & Plan   Medicare annual wellness visit, subsequent  Type 2 diabetes mellitus with diabetic polyneuropathy, with long-term current use of insulin (HCC)  -     POCT glycosylated hemoglobin (Hb A1C)  -     Insulin Glargine, 2 Unit Dial, (TOUJEO MAX SOLOSTAR) 300 UNIT/ML SOPN; Inject 30 Units into the skin daily, Disp-18 mL, R-1Normal  Left hand pain  -     External Referral To Physical Therapy    Increase Lantus to 30 units daily  Physical therapy for left wrist    Recommendations for Preventive Services Due: see orders and patient instructions/AVS.  Recommended screening schedule for the next 5-10 years is provided to the patient in written form: see Patient Instructions/AVS.     Return in 4 months (on 11/24/2023) for diabetes. Subjective   The following acute and/or chronic problems were also addressed today:  He has an appointment at Friday at [facility] in [place] for MRI to check renal cyst.    Left hand is improving. He was able to golf on Sunday. It is still sore. Neuropathy has improved some. HgA1C = 7.4 today  MA assisted him doing finger stick blood sugar. He has not been to the pool recently. Obtain EKG results. Patient's complete Health Risk Assessment and screening values have been reviewed and are found in Flowsheets. The following problems were reviewed today and where indicated follow up appointments were made and/or referrals ordered. Positive Risk Factor Screenings with Interventions:    Fall Risk:  Do you feel unsteady or are you worried about falling? : (!) yes  2 or more falls in past year?: no  Fall with injury in past year?: no     Interventions:    Patient comments: continue pool exercises             General HRA Questions:  Select all that apply: (!) New or Increased Pain    Pain Interventions:   Follow up with Dr. Zafar Shaver       Weight and Activity:  Physical Activity:

## 2023-08-23 ENCOUNTER — TELEPHONE (OUTPATIENT)
Dept: PRIMARY CARE CLINIC | Age: 68
End: 2023-08-23

## 2023-08-23 DIAGNOSIS — M25.521 ELBOW PAIN, RIGHT: Primary | ICD-10-CM

## 2023-08-23 NOTE — TELEPHONE ENCOUNTER
Pt is currently in PT for his left arm. C/o his right elbow starting hurting past couple of days. No edema. Pain level about a 5, no injury. Using heat/ice. Had a hard time sleeping last night. Asking what you recommend? Onur's Pt.

## 2023-08-23 NOTE — TELEPHONE ENCOUNTER
Is he taking any naproxen/ aleve or other NSAIDs? If not then could use voltaren gel qid. Also could try another topical like aspercreme with lidocaine.

## 2023-08-23 NOTE — TELEPHONE ENCOUNTER
Pt said that he is using an Nsaid and Voltaren Gel. Would like an order for PT for his elbow. Going to PT tomorrow and they wont take a look at it without an order. RT Elbow. Needs faxed to [facility].

## 2023-08-24 ENCOUNTER — TELEPHONE (OUTPATIENT)
Dept: PRIMARY CARE CLINIC | Age: 68
End: 2023-08-24

## 2023-08-24 DIAGNOSIS — M25.521 ELBOW PAIN, RIGHT: Primary | ICD-10-CM

## 2023-08-24 NOTE — TELEPHONE ENCOUNTER
Karlene Gresham from [email or web address] in [place] is asking that the order from 08/23/23 be changed from PT to OT. Pt is scheduled to see OT already for his Lt hand. Karlene Gresham can be reached at [phone]. Please advise.

## 2023-08-25 NOTE — TELEPHONE ENCOUNTER
See other messages. Pt calling to let you know that he broke his arm and has to have a more precise MRI on Monday. Currently in a Splint and waiting to here back from the Ortho. Pt not sure how he broke it, due to not being aware of any injury to it.  GABY

## 2023-11-27 ENCOUNTER — OFFICE VISIT (OUTPATIENT)
Dept: PRIMARY CARE CLINIC | Age: 68
End: 2023-11-27
Payer: COMMERCIAL

## 2023-11-27 VITALS
HEIGHT: 71 IN | OXYGEN SATURATION: 98 % | SYSTOLIC BLOOD PRESSURE: 128 MMHG | HEART RATE: 120 BPM | BODY MASS INDEX: 36.4 KG/M2 | DIASTOLIC BLOOD PRESSURE: 68 MMHG | WEIGHT: 260 LBS

## 2023-11-27 DIAGNOSIS — D86.9 SARCOIDOSIS: ICD-10-CM

## 2023-11-27 DIAGNOSIS — E11.42 TYPE 2 DIABETES MELLITUS WITH DIABETIC POLYNEUROPATHY, WITH LONG-TERM CURRENT USE OF INSULIN (HCC): Primary | ICD-10-CM

## 2023-11-27 DIAGNOSIS — I10 ESSENTIAL HYPERTENSION: ICD-10-CM

## 2023-11-27 DIAGNOSIS — Z79.4 TYPE 2 DIABETES MELLITUS WITH DIABETIC POLYNEUROPATHY, WITH LONG-TERM CURRENT USE OF INSULIN (HCC): Primary | ICD-10-CM

## 2023-11-27 LAB
CREATININE URINE POCT: NORMAL
HBA1C MFR BLD: 7.1 %
MICROALBUMIN/CREAT 24H UR: NORMAL MG/G{CREAT}
MICROALBUMIN/CREAT UR-RTO: NORMAL

## 2023-11-27 PROCEDURE — 99213 OFFICE O/P EST LOW 20 MIN: CPT | Performed by: NURSE PRACTITIONER

## 2023-11-27 PROCEDURE — 82044 UR ALBUMIN SEMIQUANTITATIVE: CPT | Performed by: NURSE PRACTITIONER

## 2023-11-27 PROCEDURE — 83036 HEMOGLOBIN GLYCOSYLATED A1C: CPT | Performed by: NURSE PRACTITIONER

## 2023-11-27 PROCEDURE — 1123F ACP DISCUSS/DSCN MKR DOCD: CPT | Performed by: NURSE PRACTITIONER

## 2023-11-27 PROCEDURE — 3078F DIAST BP <80 MM HG: CPT | Performed by: NURSE PRACTITIONER

## 2023-11-27 PROCEDURE — 3074F SYST BP LT 130 MM HG: CPT | Performed by: NURSE PRACTITIONER

## 2023-11-27 PROCEDURE — 3051F HG A1C>EQUAL 7.0%<8.0%: CPT | Performed by: NURSE PRACTITIONER

## 2023-11-27 RX ORDER — HYDROXYCHLOROQUINE SULFATE 200 MG/1
200 TABLET, FILM COATED ORAL 2 TIMES DAILY
COMMUNITY
Start: 2023-10-19

## 2023-11-27 ASSESSMENT — ENCOUNTER SYMPTOMS
NAUSEA: 0
CONSTIPATION: 0
COUGH: 0
SHORTNESS OF BREATH: 0
DIARRHEA: 0

## 2023-11-27 NOTE — PATIENT INSTRUCTIONS
Continue acetaminophen and advil as needed for pain. Follow up with [facility] and limit carbohydrates and sugars.

## 2023-11-27 NOTE — PROGRESS NOTES
ill-appearing. HENT:      Head: Normocephalic and atraumatic. Right Ear: Tympanic membrane, ear canal and external ear normal.      Left Ear: Tympanic membrane, ear canal and external ear normal.   Eyes:      General: No scleral icterus. Right eye: No discharge. Left eye: No discharge. Conjunctiva/sclera: Conjunctivae normal.   Neck:      Thyroid: No thyromegaly. Trachea: No tracheal deviation. Cardiovascular:      Rate and Rhythm: Normal rate and regular rhythm. Heart sounds: Normal heart sounds. Comments: No carotid bruit  Bilateral lower extremity 1-2 edema  Pulmonary:      Effort: Pulmonary effort is normal. No respiratory distress. Breath sounds: Normal breath sounds. No wheezing. Abdominal:      General: Bowel sounds are normal.   Lymphadenopathy:      Cervical: No cervical adenopathy. Skin:     General: Skin is warm. Findings: No rash. Neurological:      Mental Status: He is alert and oriented to person, place, and time. Psychiatric:         Mood and Affect: Mood normal.         Behavior: Behavior normal.         Thought Content: Thought content normal.         Assessment/Plan:   1. Type 2 diabetes mellitus with diabetic polyneuropathy, with long-term current use of insulin (HCC)  -     POCT glycosylated hemoglobin (Hb A1C)  -     POCT microalbumin  2. Essential hypertension  3. Sarcoidosis     Continue acetaminophen and Advil as needed for pain. Follow up with [address] [place] [address] and limit carbohydrates and sugars. Return in about 4 months (around 3/27/2024) for diabetes. Data Unavailable     Orders Placed This Encounter   Procedures    POCT glycosylated hemoglobin (Hb A1C)    POCT microalbumin     No orders of the defined types were placed in this encounter. Patient given educational materials - see patient instructions. Discussed use, benefit, and side effects of prescribed medications. All patient questions answered.  Pt

## 2023-12-04 ENCOUNTER — TELEPHONE (OUTPATIENT)
Dept: PRIMARY CARE CLINIC | Age: 68
End: 2023-12-04

## 2023-12-04 DIAGNOSIS — R26.89 BALANCE PROBLEMS: Primary | ICD-10-CM

## 2023-12-04 DIAGNOSIS — Z79.4 TYPE 2 DIABETES MELLITUS WITH DIABETIC POLYNEUROPATHY, WITH LONG-TERM CURRENT USE OF INSULIN (HCC): ICD-10-CM

## 2023-12-04 DIAGNOSIS — E11.42 TYPE 2 DIABETES MELLITUS WITH DIABETIC POLYNEUROPATHY, WITH LONG-TERM CURRENT USE OF INSULIN (HCC): ICD-10-CM

## 2023-12-04 NOTE — TELEPHONE ENCOUNTER
Pt asking for a new referral to [facility]. Has balance issues.  Fax to [phone]

## 2023-12-04 NOTE — TELEPHONE ENCOUNTER
LAST VISIT:   4/17/2023     Future Appointments   Date Time Provider [address]   3/26/2024 10:30 AM KADIE Reveles CNP [facility]EVIE Roman

## 2024-01-03 ENCOUNTER — TELEPHONE (OUTPATIENT)
Dept: PRIMARY CARE CLINIC | Age: 69
End: 2024-01-03

## 2024-01-03 NOTE — TELEPHONE ENCOUNTER
Pt called stating he needs a note from pcp to allow him to use the pool to do warm water therapy. Pt stated he thought we sent a letter previously but I do not see any mention of this in chart. Ok for letter?    Please advise     Please fax letter to  [facility] [phone] when ready

## 2024-02-05 ENCOUNTER — OFFICE VISIT (OUTPATIENT)
Dept: PRIMARY CARE CLINIC | Age: 69
End: 2024-02-05
Payer: MEDICARE

## 2024-02-05 ENCOUNTER — TELEPHONE (OUTPATIENT)
Dept: PRIMARY CARE CLINIC | Age: 69
End: 2024-02-05

## 2024-02-05 VITALS
WEIGHT: 260.1 LBS | OXYGEN SATURATION: 99 % | HEIGHT: 71 IN | DIASTOLIC BLOOD PRESSURE: 62 MMHG | BODY MASS INDEX: 36.41 KG/M2 | SYSTOLIC BLOOD PRESSURE: 128 MMHG | HEART RATE: 105 BPM

## 2024-02-05 DIAGNOSIS — S29.011A CHEST WALL MUSCLE STRAIN, INITIAL ENCOUNTER: Primary | ICD-10-CM

## 2024-02-05 PROCEDURE — 3074F SYST BP LT 130 MM HG: CPT | Performed by: NURSE PRACTITIONER

## 2024-02-05 PROCEDURE — 99213 OFFICE O/P EST LOW 20 MIN: CPT | Performed by: NURSE PRACTITIONER

## 2024-02-05 PROCEDURE — 1123F ACP DISCUSS/DSCN MKR DOCD: CPT | Performed by: NURSE PRACTITIONER

## 2024-02-05 PROCEDURE — 3078F DIAST BP <80 MM HG: CPT | Performed by: NURSE PRACTITIONER

## 2024-02-05 RX ORDER — TIZANIDINE 4 MG/1
4 TABLET ORAL EVERY 8 HOURS PRN
Qty: 30 TABLET | Refills: 0 | Status: SHIPPED | OUTPATIENT
Start: 2024-02-05

## 2024-02-05 RX ORDER — IBUPROFEN 800 MG/1
800 TABLET ORAL 3 TIMES DAILY PRN
Qty: 90 TABLET | Refills: 0 | Status: SHIPPED | OUTPATIENT
Start: 2024-02-05

## 2024-02-05 ASSESSMENT — ENCOUNTER SYMPTOMS
WHEEZING: 0
SHORTNESS OF BREATH: 0
BACK PAIN: 1
DIARRHEA: 0
ABDOMINAL PAIN: 0
SINUS PRESSURE: 0
SINUS PAIN: 0
CONSTIPATION: 0

## 2024-02-05 ASSESSMENT — PATIENT HEALTH QUESTIONNAIRE - PHQ9
SUM OF ALL RESPONSES TO PHQ9 QUESTIONS 1 & 2: 0
SUM OF ALL RESPONSES TO PHQ QUESTIONS 1-9: 0
2. FEELING DOWN, DEPRESSED OR HOPELESS: 0
1. LITTLE INTEREST OR PLEASURE IN DOING THINGS: 0

## 2024-02-05 NOTE — TELEPHONE ENCOUNTER
Spoke with Dr Parekh office regarding infusions for pt and if he is allowed to have steroids while on infusions.  They stated that the doctor was gone for the day and they left him a message and will get back to us.    Called another number provider by the pt the office stated that they were closing soon and  they providers were out of the office and they will call us back with the answer.   How Severe Is Your Skin Lesion?: mild Have Your Skin Lesions Been Treated?: not been treated Is This A New Presentation, Or A Follow-Up?: Skin Lesions

## 2024-02-05 NOTE — PROGRESS NOTES
[facility]  [facility]  [address] [address]  Dept: [phone]    Danilo David is a 68 y.o. male Established patient, who presents today for his medical conditions/complaints as noted below.      Chief Complaint   Patient presents with    Chest Pain     Intermittent on and off possible muscular pain       HPI:     Chest Pain   Associated symptoms include back pain and numbness. Pertinent negatives include no abdominal pain, dizziness, palpitations or shortness of breath.      For the past few weeks, he has increased back and hip pain  Went to the orthopedic Dr. last Monday and he said he had arthritis in back and bursitis in the right hip.    He has been doing infusion therapy for sarcoidosis and has been having side effects.  He does the infusion therapy every 30 days for 2 days in a row.  Last session around January 6th.  He states he woke up after having the therapy and felt like the room was spinning.  He called [facility] and they told him it was probably dehydration  He has not had any episodes like this since    Chest pain started about a week and a half ago. Describes it as a chest tightness  He states he was not doing anything that would have caused this.  States it is worse when he bends over and picks objects up  He denies any palpitations or shortness of breath.  Was taking Hydrocodone and Ibuprofen for the pain with some relief      Reviewed prior notes: None   Reviewed previous:  Labs    LDL Cholesterol (mg/dL)   Date Value   04/17/2023 69     LDL Calculated (mg/dL)   Date Value   11/02/2021 52   12/13/2019 52   12/02/2016 46       (goal LDL is <100)   AST (U/L)   Date Value   04/17/2023 18     ALT (U/L)   Date Value   04/17/2023 23     BUN (mg/dL)   Date Value   04/17/2023 25 (H)     Hemoglobin A1C (%)   Date Value   11/27/2023 7.1     BP Readings from Last 3 Encounters:   02/05/24 128/62   11/27/23 128/68   07/24/23 130/80  English

## 2024-02-06 RX ORDER — TAMSULOSIN HYDROCHLORIDE 0.4 MG/1
0.4 CAPSULE ORAL DAILY
Qty: 30 CAPSULE | Refills: 0 | Status: SHIPPED | OUTPATIENT
Start: 2024-02-06 | End: 2024-02-07 | Stop reason: SDUPTHER

## 2024-02-07 RX ORDER — TAMSULOSIN HYDROCHLORIDE 0.4 MG/1
0.4 CAPSULE ORAL DAILY
Qty: 90 CAPSULE | Refills: 3 | Status: SHIPPED | OUTPATIENT
Start: 2024-02-07

## 2024-02-08 NOTE — TELEPHONE ENCOUNTER
Per Rachel Angeles    Office called back and state it is ok for patient to continue Testosterone, this will not interfere with his treatment form Dr. Parekh

## 2024-02-08 NOTE — TELEPHONE ENCOUNTER
Patient is not on testosterone.  I was considering starting him on prednisone due to chest wall strain.  The question was can he be on a short course of prednisone to reduce inflammation.  Please ask for clarification.

## 2024-02-27 NOTE — TELEPHONE ENCOUNTER
I called patient.  He states after taking muscle relaxant for a few days, chest wall discomfort began to resolve and is now much improved.  He is seeing a new sarcoidosis physician and hopes to get some helpful treatment through new office.

## 2024-02-27 NOTE — TELEPHONE ENCOUNTER
I was able to get in contact with the office who confirmed, according to Dr. Rojas, it is safe for patient to take a steroid while on the infusions.    Updated phone number for Dr. Rojas was put into patients care team list.

## 2024-03-22 RX ORDER — OMEPRAZOLE 40 MG/1
40 CAPSULE, DELAYED RELEASE ORAL DAILY
Qty: 90 CAPSULE | Refills: 3 | Status: SHIPPED | OUTPATIENT
Start: 2024-03-22

## 2024-03-26 ENCOUNTER — OFFICE VISIT (OUTPATIENT)
Dept: PRIMARY CARE CLINIC | Age: 69
End: 2024-03-26
Payer: MEDICARE

## 2024-03-26 VITALS
HEIGHT: 71 IN | WEIGHT: 262 LBS | BODY MASS INDEX: 36.68 KG/M2 | OXYGEN SATURATION: 96 % | DIASTOLIC BLOOD PRESSURE: 78 MMHG | HEART RATE: 108 BPM | SYSTOLIC BLOOD PRESSURE: 124 MMHG

## 2024-03-26 DIAGNOSIS — Z00.00 MEDICARE ANNUAL WELLNESS VISIT, SUBSEQUENT: Primary | ICD-10-CM

## 2024-03-26 DIAGNOSIS — Z12.5 SCREENING PSA (PROSTATE SPECIFIC ANTIGEN): ICD-10-CM

## 2024-03-26 DIAGNOSIS — Z79.4 TYPE 2 DIABETES MELLITUS WITH DIABETIC POLYNEUROPATHY, WITH LONG-TERM CURRENT USE OF INSULIN (HCC): ICD-10-CM

## 2024-03-26 DIAGNOSIS — N40.1 BENIGN PROSTATIC HYPERPLASIA WITH WEAK URINARY STREAM: ICD-10-CM

## 2024-03-26 DIAGNOSIS — E11.42 TYPE 2 DIABETES MELLITUS WITH DIABETIC POLYNEUROPATHY, WITH LONG-TERM CURRENT USE OF INSULIN (HCC): ICD-10-CM

## 2024-03-26 DIAGNOSIS — R39.12 BENIGN PROSTATIC HYPERPLASIA WITH WEAK URINARY STREAM: ICD-10-CM

## 2024-03-26 DIAGNOSIS — I10 ESSENTIAL HYPERTENSION: ICD-10-CM

## 2024-03-26 DIAGNOSIS — E78.2 MIXED HYPERLIPIDEMIA: ICD-10-CM

## 2024-03-26 PROBLEM — G61.9 INFLAMMATORY NEUROPATHY (HCC): Status: ACTIVE | Noted: 2023-12-11

## 2024-03-26 LAB — HBA1C MFR BLD: 6.2 %

## 2024-03-26 PROCEDURE — 3078F DIAST BP <80 MM HG: CPT | Performed by: NURSE PRACTITIONER

## 2024-03-26 PROCEDURE — 83036 HEMOGLOBIN GLYCOSYLATED A1C: CPT | Performed by: NURSE PRACTITIONER

## 2024-03-26 PROCEDURE — 3044F HG A1C LEVEL LT 7.0%: CPT | Performed by: NURSE PRACTITIONER

## 2024-03-26 PROCEDURE — G0439 PPPS, SUBSEQ VISIT: HCPCS | Performed by: NURSE PRACTITIONER

## 2024-03-26 PROCEDURE — 1123F ACP DISCUSS/DSCN MKR DOCD: CPT | Performed by: NURSE PRACTITIONER

## 2024-03-26 PROCEDURE — 3074F SYST BP LT 130 MM HG: CPT | Performed by: NURSE PRACTITIONER

## 2024-03-26 RX ORDER — FINASTERIDE 5 MG/1
TABLET, FILM COATED ORAL
Qty: 90 TABLET | Refills: 3 | Status: SHIPPED | OUTPATIENT
Start: 2024-03-26

## 2024-03-26 RX ORDER — PREGABALIN 25 MG/1
25 CAPSULE ORAL 2 TIMES DAILY
COMMUNITY

## 2024-03-26 RX ORDER — IRBESARTAN 150 MG/1
150 TABLET ORAL NIGHTLY
Qty: 90 TABLET | Refills: 3 | Status: SHIPPED | OUTPATIENT
Start: 2024-03-26

## 2024-03-26 RX ORDER — CHLORHEXIDINE GLUCONATE ORAL RINSE 1.2 MG/ML
15 SOLUTION DENTAL 2 TIMES DAILY
COMMUNITY
Start: 2024-02-12

## 2024-03-26 RX ORDER — HYDROCODONE BITARTRATE AND ACETAMINOPHEN 5; 325 MG/1; MG/1
1 TABLET ORAL EVERY 6 HOURS PRN
COMMUNITY
Start: 2024-02-15

## 2024-03-26 RX ORDER — SIMVASTATIN 20 MG
TABLET ORAL
Qty: 90 TABLET | Refills: 3 | Status: SHIPPED | OUTPATIENT
Start: 2024-03-26 | End: 2024-03-28 | Stop reason: SDUPTHER

## 2024-03-26 SDOH — HEALTH STABILITY: PHYSICAL HEALTH: ON AVERAGE, HOW MANY MINUTES DO YOU ENGAGE IN EXERCISE AT THIS LEVEL?: 10 MIN

## 2024-03-26 SDOH — HEALTH STABILITY: PHYSICAL HEALTH: ON AVERAGE, HOW MANY DAYS PER WEEK DO YOU ENGAGE IN MODERATE TO STRENUOUS EXERCISE (LIKE A BRISK WALK)?: 2 DAYS

## 2024-03-26 ASSESSMENT — LIFESTYLE VARIABLES
HOW MANY STANDARD DRINKS CONTAINING ALCOHOL DO YOU HAVE ON A TYPICAL DAY: 1 OR 2
HOW OFTEN DO YOU HAVE SIX OR MORE DRINKS ON ONE OCCASION: 1
HOW OFTEN DO YOU HAVE A DRINK CONTAINING ALCOHOL: MONTHLY OR LESS
HOW MANY STANDARD DRINKS CONTAINING ALCOHOL DO YOU HAVE ON A TYPICAL DAY: 1
HOW OFTEN DO YOU HAVE A DRINK CONTAINING ALCOHOL: 2

## 2024-03-26 ASSESSMENT — PATIENT HEALTH QUESTIONNAIRE - PHQ9
1. LITTLE INTEREST OR PLEASURE IN DOING THINGS: NOT AT ALL
SUM OF ALL RESPONSES TO PHQ9 QUESTIONS 1 & 2: 0
SUM OF ALL RESPONSES TO PHQ QUESTIONS 1-9: 0
2. FEELING DOWN, DEPRESSED OR HOPELESS: NOT AT ALL
SUM OF ALL RESPONSES TO PHQ QUESTIONS 1-9: 0

## 2024-03-26 NOTE — PROGRESS NOTES
Reviewed and updated this visit:  Tobacco  Allergies  Meds  Problems  Med Hx  Surg Hx  Soc Hx  Fam Hx

## 2024-03-27 LAB
ALBUMIN SERPL-MCNC: NORMAL G/DL
ALP BLD-CCNC: NORMAL U/L
ALT SERPL-CCNC: NORMAL U/L
AST SERPL-CCNC: NORMAL U/L
BILIRUB SERPL-MCNC: NORMAL MG/DL
BUN BLDV-MCNC: NORMAL MG/DL
CALCIUM SERPL-MCNC: NORMAL MG/DL
CHLORIDE BLD-SCNC: NORMAL MMOL/L
CHOLESTEROL, FASTING: 136
CO2: NORMAL
CREAT SERPL-MCNC: NORMAL MG/DL
GLUCOSE FASTING: 129 MG/DL
HDLC SERPL-MCNC: 44 MG/DL (ref 35–70)
LDL CHOLESTEROL CALCULATED: 45 MG/DL (ref 0–160)
POTASSIUM SERPL-SCNC: NORMAL MMOL/L
PROSTATE SPECIFIC ANTIGEN: NORMAL
SODIUM BLD-SCNC: NORMAL MMOL/L
TOTAL PROTEIN: NORMAL
TRIGLYCERIDE, FASTING: 234

## 2024-03-28 DIAGNOSIS — I10 ESSENTIAL HYPERTENSION: ICD-10-CM

## 2024-03-28 DIAGNOSIS — E11.42 TYPE 2 DIABETES MELLITUS WITH DIABETIC POLYNEUROPATHY, WITH LONG-TERM CURRENT USE OF INSULIN (HCC): ICD-10-CM

## 2024-03-28 DIAGNOSIS — E78.2 MIXED HYPERLIPIDEMIA: ICD-10-CM

## 2024-03-28 DIAGNOSIS — Z12.5 SCREENING PSA (PROSTATE SPECIFIC ANTIGEN): ICD-10-CM

## 2024-03-28 DIAGNOSIS — Z79.4 TYPE 2 DIABETES MELLITUS WITH DIABETIC POLYNEUROPATHY, WITH LONG-TERM CURRENT USE OF INSULIN (HCC): ICD-10-CM

## 2024-03-28 RX ORDER — SIMVASTATIN 40 MG
TABLET ORAL
Qty: 90 TABLET | Refills: 3 | Status: SHIPPED | OUTPATIENT
Start: 2024-03-28

## 2024-03-28 NOTE — PROGRESS NOTES
Glucose and triglycerides elevated.  Recommend increase simvastatin to 40 mg nightly.  New prescription sent to pharmacy.

## 2024-05-20 DIAGNOSIS — Z79.4 TYPE 2 DIABETES MELLITUS WITH DIABETIC POLYNEUROPATHY, WITH LONG-TERM CURRENT USE OF INSULIN (HCC): ICD-10-CM

## 2024-05-20 DIAGNOSIS — E11.42 TYPE 2 DIABETES MELLITUS WITH DIABETIC POLYNEUROPATHY, WITH LONG-TERM CURRENT USE OF INSULIN (HCC): ICD-10-CM

## 2024-05-20 DIAGNOSIS — S29.011A CHEST WALL MUSCLE STRAIN, INITIAL ENCOUNTER: ICD-10-CM

## 2024-05-20 RX ORDER — IBUPROFEN 800 MG/1
TABLET ORAL
Qty: 90 TABLET | Refills: 0 | Status: SHIPPED | OUTPATIENT
Start: 2024-05-20

## 2024-05-20 RX ORDER — PEN NEEDLE, DIABETIC 32 GX 1/4"
1 NEEDLE, DISPOSABLE MISCELLANEOUS DAILY
Qty: 100 EACH | Refills: 3 | Status: SHIPPED | OUTPATIENT
Start: 2024-05-20

## 2024-05-20 NOTE — TELEPHONE ENCOUNTER
LAST VISIT:   3/26/2024     Future Appointments   Date Time Provider Department Center   7/29/2024 10:30 AM Onur Santo APRN - [facility]

## 2024-06-06 ENCOUNTER — TELEPHONE (OUTPATIENT)
Dept: PRIMARY CARE CLINIC | Age: 69
End: 2024-06-06

## 2024-06-06 DIAGNOSIS — M54.2 CERVICAL PAIN: Primary | ICD-10-CM

## 2024-06-06 NOTE — TELEPHONE ENCOUNTER
Patient is requesting advise for a neck ache he's had for about 5 days.    Patient states the pain goes from feeling hot to achy, only affecting the left side of his neck going into shoulder. Patient states he is able to bring his chin to his chest, patient denies recent fever.    No available appointments with PCP in the next few days. Pharmacy confirmed. Please advise.

## 2024-06-06 NOTE — TELEPHONE ENCOUNTER
Apply heat and ice to effective area. First apply heat to effective area for 15 minutes then immediately afterward apply ice for 15 minutes 3x per day.      Patient can try Voltaren gel or biofreeze to the area. I will send voltaren to pharmacy.    Do neck and shoulder stretches 3x/day.    If does not improve see a provider.

## 2024-06-12 ENCOUNTER — TELEPHONE (OUTPATIENT)
Dept: PRIMARY CARE CLINIC | Age: 69
End: 2024-06-12

## 2024-06-12 NOTE — TELEPHONE ENCOUNTER
Pt states he would not consider it diarrhea but he has went multiple times. Does not have any signs of infection. Yes he also had different eating , states he ate early afternoon and again at 10pm. Re checked blood sugar today & it went down to 181.

## 2024-06-12 NOTE — TELEPHONE ENCOUNTER
Hemoglobin A1c was 6.2 in March.  At that time his blood sugar were generally well-controlled.  Does he have any signs of an infection?  Does he have any fever, chills, sore throat, shortness of breath, cough, nausea, diarrhea, or anything else that might indicate an infection?  Was his eating habits different yesterday?  Continue to monitor blood sugars and if they stay elevated make an appointment.

## 2024-06-12 NOTE — TELEPHONE ENCOUNTER
Pt states he had an episode last night feeling like he was going to pass out, took blood sugar and it was 301. Please advise.

## 2024-06-13 DIAGNOSIS — R60.0 LOCALIZED EDEMA: ICD-10-CM

## 2024-06-14 RX ORDER — TORSEMIDE 20 MG/1
20 TABLET ORAL 2 TIMES DAILY
Qty: 180 TABLET | Refills: 3 | Status: SHIPPED | OUTPATIENT
Start: 2024-06-14

## 2024-07-18 DIAGNOSIS — Z79.4 TYPE 2 DIABETES MELLITUS WITH DIABETIC POLYNEUROPATHY, WITH LONG-TERM CURRENT USE OF INSULIN (HCC): ICD-10-CM

## 2024-07-18 DIAGNOSIS — E11.42 TYPE 2 DIABETES MELLITUS WITH DIABETIC POLYNEUROPATHY, WITH LONG-TERM CURRENT USE OF INSULIN (HCC): ICD-10-CM

## 2024-07-18 RX ORDER — INSULIN GLARGINE 300 U/ML
30 INJECTION, SOLUTION SUBCUTANEOUS DAILY
Qty: 18 ML | Refills: 1 | Status: SHIPPED | OUTPATIENT
Start: 2024-07-18

## 2024-07-18 NOTE — TELEPHONE ENCOUNTER
LAST VISIT:   4/17/2023     Future Appointments   Date Time Provider Department Center   7/29/2024 10:30 AM [facility] Santo [facility] - [facility]

## 2024-07-29 ENCOUNTER — OFFICE VISIT (OUTPATIENT)
Dept: PRIMARY CARE CLINIC | Age: 69
End: 2024-07-29
Payer: MEDICARE

## 2024-07-29 VITALS
HEIGHT: 71 IN | HEART RATE: 111 BPM | DIASTOLIC BLOOD PRESSURE: 88 MMHG | SYSTOLIC BLOOD PRESSURE: 130 MMHG | BODY MASS INDEX: 37.66 KG/M2 | OXYGEN SATURATION: 97 % | WEIGHT: 269 LBS

## 2024-07-29 DIAGNOSIS — I10 ESSENTIAL HYPERTENSION: ICD-10-CM

## 2024-07-29 DIAGNOSIS — E11.42 TYPE 2 DIABETES MELLITUS WITH DIABETIC POLYNEUROPATHY, WITH LONG-TERM CURRENT USE OF INSULIN (HCC): Primary | ICD-10-CM

## 2024-07-29 DIAGNOSIS — D86.9 SARCOIDOSIS: ICD-10-CM

## 2024-07-29 DIAGNOSIS — Z79.4 TYPE 2 DIABETES MELLITUS WITH DIABETIC POLYNEUROPATHY, WITH LONG-TERM CURRENT USE OF INSULIN (HCC): Primary | ICD-10-CM

## 2024-07-29 PROBLEM — R06.09 DYSPNEA ON EXERTION: Status: ACTIVE | Noted: 2024-05-17

## 2024-07-29 PROBLEM — M25.559 HIP PAIN: Status: RESOLVED | Noted: 2021-06-24 | Resolved: 2024-07-29

## 2024-07-29 LAB — HBA1C MFR BLD: 7.2 %

## 2024-07-29 PROCEDURE — 83036 HEMOGLOBIN GLYCOSYLATED A1C: CPT | Performed by: NURSE PRACTITIONER

## 2024-07-29 PROCEDURE — 1123F ACP DISCUSS/DSCN MKR DOCD: CPT | Performed by: NURSE PRACTITIONER

## 2024-07-29 PROCEDURE — 99214 OFFICE O/P EST MOD 30 MIN: CPT | Performed by: NURSE PRACTITIONER

## 2024-07-29 PROCEDURE — 3079F DIAST BP 80-89 MM HG: CPT | Performed by: NURSE PRACTITIONER

## 2024-07-29 PROCEDURE — 3051F HG A1C>EQUAL 7.0%<8.0%: CPT | Performed by: NURSE PRACTITIONER

## 2024-07-29 PROCEDURE — 3075F SYST BP GE 130 - 139MM HG: CPT | Performed by: NURSE PRACTITIONER

## 2024-07-29 RX ORDER — SEMAGLUTIDE 1.34 MG/ML
1 INJECTION, SOLUTION SUBCUTANEOUS
Qty: 3 ML | Refills: 0 | Status: SHIPPED | OUTPATIENT
Start: 2024-09-23

## 2024-07-29 RX ORDER — INSULIN GLARGINE 300 U/ML
30 INJECTION, SOLUTION SUBCUTANEOUS DAILY
Qty: 18 ML | Refills: 1 | Status: SHIPPED | OUTPATIENT
Start: 2024-07-29

## 2024-07-29 RX ORDER — SEMAGLUTIDE 1.34 MG/ML
0.25 INJECTION, SOLUTION SUBCUTANEOUS WEEKLY
Qty: 1.5 ML | Refills: 0 | Status: SHIPPED | OUTPATIENT
Start: 2024-07-29 | End: 2024-08-20

## 2024-07-29 RX ORDER — SEMAGLUTIDE 1.34 MG/ML
0.5 INJECTION, SOLUTION SUBCUTANEOUS WEEKLY
Qty: 1.5 ML | Refills: 0 | Status: SHIPPED | OUTPATIENT
Start: 2024-08-26 | End: 2024-09-17

## 2024-07-29 RX ORDER — METHOTREXATE 2.5 MG/1
10 TABLET ORAL WEEKLY
Qty: 1 TABLET | Refills: 0
Start: 2024-07-29

## 2024-07-29 SDOH — ECONOMIC STABILITY: FOOD INSECURITY: WITHIN THE PAST 12 MONTHS, THE FOOD YOU BOUGHT JUST DIDN'T LAST AND YOU DIDN'T HAVE MONEY TO GET MORE.: NEVER TRUE

## 2024-07-29 SDOH — ECONOMIC STABILITY: FOOD INSECURITY: WITHIN THE PAST 12 MONTHS, YOU WORRIED THAT YOUR FOOD WOULD RUN OUT BEFORE YOU GOT MONEY TO BUY MORE.: NEVER TRUE

## 2024-07-29 SDOH — ECONOMIC STABILITY: HOUSING INSECURITY
IN THE LAST 12 MONTHS, WAS THERE A TIME WHEN YOU DID NOT HAVE A STEADY PLACE TO SLEEP OR SLEPT IN A SHELTER (INCLUDING NOW)?: NO

## 2024-07-29 SDOH — ECONOMIC STABILITY: INCOME INSECURITY: HOW HARD IS IT FOR YOU TO PAY FOR THE VERY BASICS LIKE FOOD, HOUSING, MEDICAL CARE, AND HEATING?: NOT HARD AT ALL

## 2024-07-29 ASSESSMENT — ENCOUNTER SYMPTOMS
NAUSEA: 0
SINUS PAIN: 0
COUGH: 0
CONSTIPATION: 0
DIARRHEA: 0
SHORTNESS OF BREATH: 0

## 2024-07-29 NOTE — PROGRESS NOTES
[facility]  [facility]  [address] [address]  Dept: [phone]    Danilo David is a 69 y.o. male Established patient, who presents today for his medical conditions/complaints as noted below.      Chief Complaint   Patient presents with    Diabetes    Other     Starting a new intravenous therapy on Thursday, questions on getting a Home Health aide, prostate waking him up at night, questions about metformin,  would like a handicap placard    Blood Sugar Problem     Has been higher but thinks it maybe medication related       HPI:     Diabetes  Pertinent negatives for hypoglycemia include no nervousness/anxiousness.      She is starting a new IV therapy for nerve damage due to sarcoidosis. He will be doing one type of IV therapy for large nerves and one for small nerves.    He had a spell when he thought he was going to block out. He had some blackness on right side. He is blood sugar was 241. He took metformin and drank water and blood sugar went up to over 300. He states it got better after a while.    He is had some difficulty remembering things in [employer] game that he usually remembers. States memory should have gotten better. He states he was not a sharp for a couple weeks but now has covered.    He is getting drop foot  He has braces on both feet.    Blood sugar fluctuates 150-250.  No change in diet.  HgA1C = 7.2  this is up from 6.2 last time..      Reviewed prior notes:  podiatry, palliative care    Reviewed previous:  Labs    No components found for: \"LDLCHOLESTEROL\", \"LDLCALC\"    (goal LDL is <100)   AST (U/L)   Date Value   04/17/2023 18     ALT (U/L)   Date Value   04/17/2023 23     BUN (mg/dL)   Date Value   04/17/2023 25 (H)     Hemoglobin A1C (%)   Date Value   03/26/2024 6.2     BP Readings from Last 3 Encounters:   07/29/24 130/88   03/26/24 124/78   02/05/24 128/62          (goal 120/80)    Past Medical History:   Diagnosis Date    GERD

## 2024-07-31 ENCOUNTER — TELEPHONE (OUTPATIENT)
Dept: PRIMARY CARE CLINIC | Age: 69
End: 2024-07-31

## 2024-07-31 NOTE — TELEPHONE ENCOUNTER
Patient states [facility] told him that they wouldn't be able to give him is Ozempic till August 23 or September 23 because the got to script with different dosages.

## 2024-07-31 NOTE — TELEPHONE ENCOUNTER
The prescriptions are correct.  Start with a lower dose and titrate up.  The dose increases after 4 weekly injections.  So every 28 days the dose will go up until medication is at therapeutic dosing.  I am not sure what the hold-up is with the first prescription we will have to check with [facility] on that.  The second prescription has a start date on it of 28 days after the first prescription.  Would [facility] like another prescription?

## 2024-08-01 NOTE — TELEPHONE ENCOUNTER
Per [facility] the first dose was already mailed out to the patient. The next dose (0.5mg) will be processed and should be mailed out 08/26/2024. The next dose (1mg) will be processed and should be mailed out 09/23/2024.    Patient notified.

## 2024-09-04 NOTE — TELEPHONE ENCOUNTER
Patient is requesting ozempic 0.5mg sample medication since [facility] will not have his script to him on time. Please sign sample med order.

## 2024-09-05 RX ORDER — SEMAGLUTIDE 0.68 MG/ML
0.5 INJECTION, SOLUTION SUBCUTANEOUS
Qty: 3 ML | Refills: 0 | Status: SHIPPED | COMMUNITY
Start: 2024-09-05 | End: 2024-09-27

## 2024-10-15 DIAGNOSIS — E11.42 TYPE 2 DIABETES MELLITUS WITH DIABETIC POLYNEUROPATHY, WITH LONG-TERM CURRENT USE OF INSULIN (HCC): ICD-10-CM

## 2024-10-15 DIAGNOSIS — Z79.4 TYPE 2 DIABETES MELLITUS WITH DIABETIC POLYNEUROPATHY, WITH LONG-TERM CURRENT USE OF INSULIN (HCC): ICD-10-CM

## 2024-10-16 RX ORDER — SEMAGLUTIDE 1.34 MG/ML
INJECTION, SOLUTION SUBCUTANEOUS
Qty: 3 ML | Refills: 0 | Status: SHIPPED | OUTPATIENT
Start: 2024-10-16

## 2024-10-29 ENCOUNTER — TELEPHONE (OUTPATIENT)
Dept: PRIMARY CARE CLINIC | Age: 69
End: 2024-10-29

## 2024-10-29 DIAGNOSIS — R30.0 DYSURIA: Primary | ICD-10-CM

## 2024-10-29 NOTE — TELEPHONE ENCOUNTER
Patient having UTI sx. Burning when urinating SX started Sunday. Tried to schedule with you, no openings, asking if you can place labs orders to have urine tested. Has appt with you 11/19    Would like orders faxed over to [facility].    [facility] [place] for pharmacy.

## 2024-10-30 DIAGNOSIS — R30.0 DYSURIA: ICD-10-CM

## 2024-10-30 LAB
BILIRUBIN, URINE: NORMAL
BLOOD, URINE: NORMAL
CLARITY, UA: NORMAL
COLOR, UA: NORMAL
GLUCOSE URINE: NORMAL
KETONES, URINE: NORMAL
LEUKOCYTE ESTERASE, URINE: NORMAL
NITRITE, URINE: NORMAL
PH UA: NORMAL
PROTEIN UA: NORMAL
SPECIFIC GRAVITY UA: NORMAL
UROBILINOGEN, URINE: NORMAL

## 2024-11-07 DIAGNOSIS — Z79.4 TYPE 2 DIABETES MELLITUS WITH DIABETIC POLYNEUROPATHY, WITH LONG-TERM CURRENT USE OF INSULIN (HCC): ICD-10-CM

## 2024-11-07 DIAGNOSIS — E11.42 TYPE 2 DIABETES MELLITUS WITH DIABETIC POLYNEUROPATHY, WITH LONG-TERM CURRENT USE OF INSULIN (HCC): ICD-10-CM

## 2024-11-07 RX ORDER — SEMAGLUTIDE 1.34 MG/ML
INJECTION, SOLUTION SUBCUTANEOUS
Qty: 9 ML | Refills: 0 | Status: SHIPPED | OUTPATIENT
Start: 2024-11-07

## 2024-11-19 ENCOUNTER — OFFICE VISIT (OUTPATIENT)
Dept: PRIMARY CARE CLINIC | Age: 69
End: 2024-11-19

## 2024-11-19 VITALS
HEIGHT: 71 IN | WEIGHT: 270.7 LBS | SYSTOLIC BLOOD PRESSURE: 122 MMHG | OXYGEN SATURATION: 97 % | HEART RATE: 107 BPM | BODY MASS INDEX: 37.9 KG/M2 | DIASTOLIC BLOOD PRESSURE: 74 MMHG

## 2024-11-19 DIAGNOSIS — E11.42 TYPE 2 DIABETES MELLITUS WITH DIABETIC POLYNEUROPATHY, WITH LONG-TERM CURRENT USE OF INSULIN (HCC): Primary | ICD-10-CM

## 2024-11-19 DIAGNOSIS — Z79.4 TYPE 2 DIABETES MELLITUS WITH DIABETIC POLYNEUROPATHY, WITH LONG-TERM CURRENT USE OF INSULIN (HCC): Primary | ICD-10-CM

## 2024-11-19 DIAGNOSIS — D86.9 SARCOIDOSIS: ICD-10-CM

## 2024-11-19 DIAGNOSIS — G61.9 INFLAMMATORY NEUROPATHY (HCC): ICD-10-CM

## 2024-11-19 DIAGNOSIS — R26.89 BALANCE PROBLEMS: ICD-10-CM

## 2024-11-19 LAB
CREATININE URINE POCT: ABNORMAL
HBA1C MFR BLD: 6.4 %
MICROALBUMIN/CREAT 24H UR: ABNORMAL MG/DL
MICROALBUMIN/CREAT UR-RTO: ABNORMAL MG/G

## 2024-11-19 RX ORDER — FOLIC ACID 1 MG/1
1000 TABLET ORAL 2 TIMES DAILY
COMMUNITY
Start: 2024-10-18

## 2024-11-19 RX ORDER — METHOTREXATE 2.5 MG/1
5 TABLET ORAL WEEKLY
Qty: 2 TABLET | Refills: 0 | Status: SHIPPED
Start: 2024-11-19

## 2024-11-19 ASSESSMENT — ENCOUNTER SYMPTOMS
BACK PAIN: 1
CONSTIPATION: 1
SHORTNESS OF BREATH: 1
NAUSEA: 0
DIARRHEA: 0

## 2024-11-19 NOTE — PROGRESS NOTES
[facility]  [facility] PRIMARY [facility]ARE  [address] [address]  Dept: [phone]    Danilo David is a 69 y.o. male Established patient, who presents today for his medical conditions/complaints as noted below.      Chief Complaint   Patient presents with    Diabetes       HPI:     Diabetes  He presents for his follow-up diabetic visit. He has type 2 diabetes mellitus. Onset time: years. Hypoglycemia symptoms include dizziness. Pertinent negatives for hypoglycemia include no confusion, headaches, nervousness/anxiousness or seizures. Associated symptoms include fatigue. There are no hypoglycemic complications. Symptoms are stable. Risk factors for coronary artery disease include diabetes mellitus, male sex, obesity and sedentary lifestyle. Current diabetic treatment includes oral agent (monotherapy), insulin injections and diet (plus semaglutide). (States blood sugar generally 170-190 when he gets up. He had a 300 a few days ago.  ) An ACE inhibitor/angiotensin II receptor blocker is being taken.      He has an appointment with Dr. Dill for colonoscopy.  He has preappointment in December 3.  No bowel problems - occasional constipation but no issues.    He is still going to pool for exercises.  However starting the first of the year the pool is requiring pool shoes which he can not get on.  The sarcoidosis doctor said warm water is not good for sarcoidosis.    He has a new CPAP machine and has some difficulty getting it adjusted at the beginning of the night.  He has a repeat pulmonary test next time he goes to [facility].    Braces help with balance    [facility] in [place] for eye test    He is still doing two types of infusion therapy.    Reviewed prior notes: Neurology, Pulmonary, and podiatry    Reviewed previous:        No components found for: \"LDLCHOLESTEROL\", \"LDLCALC\"    (goal LDL is <100)   AST (U/L)   Date Value   04/17/2023 18     ALT (U/L)

## 2024-11-19 NOTE — PATIENT INSTRUCTIONS
Left lateral leg open area. 1.5 x 1.8 x 0.1 with pink base. Scabs surrrounding area. Mild tenderness. Mild edema.  Cleanse and pat dry, apply triple antibiotic ointment, cover with band aid daily and has needed.

## 2024-12-03 DIAGNOSIS — E11.42 TYPE 2 DIABETES MELLITUS WITH DIABETIC POLYNEUROPATHY, WITH LONG-TERM CURRENT USE OF INSULIN (HCC): ICD-10-CM

## 2024-12-03 DIAGNOSIS — Z79.4 TYPE 2 DIABETES MELLITUS WITH DIABETIC POLYNEUROPATHY, WITH LONG-TERM CURRENT USE OF INSULIN (HCC): ICD-10-CM

## 2024-12-03 RX ORDER — SEMAGLUTIDE 1.34 MG/ML
1 INJECTION, SOLUTION SUBCUTANEOUS
Qty: 9 ML | Refills: 0 | Status: SHIPPED | OUTPATIENT
Start: 2024-12-03

## 2024-12-03 RX ORDER — SEMAGLUTIDE 0.68 MG/ML
INJECTION, SOLUTION SUBCUTANEOUS
Qty: 3 ML | Refills: 0 | OUTPATIENT
Start: 2024-12-03

## 2024-12-27 DIAGNOSIS — Z79.4 TYPE 2 DIABETES MELLITUS WITH DIABETIC POLYNEUROPATHY, WITH LONG-TERM CURRENT USE OF INSULIN (HCC): ICD-10-CM

## 2024-12-27 DIAGNOSIS — E11.42 TYPE 2 DIABETES MELLITUS WITH DIABETIC POLYNEUROPATHY, WITH LONG-TERM CURRENT USE OF INSULIN (HCC): ICD-10-CM

## 2024-12-27 RX ORDER — SEMAGLUTIDE 2.68 MG/ML
2 INJECTION, SOLUTION SUBCUTANEOUS
Qty: 9 ML | Refills: 1 | Status: SHIPPED | OUTPATIENT
Start: 2024-12-27

## 2024-12-27 RX ORDER — SEMAGLUTIDE 1.34 MG/ML
1 INJECTION, SOLUTION SUBCUTANEOUS
Qty: 9 ML | Refills: 0 | Status: CANCELLED | OUTPATIENT
Start: 2024-12-27

## 2024-12-27 NOTE — TELEPHONE ENCOUNTER
Patient states he needs a refill of ozempic however patient states he is supposed to be on the 2 mg dose.    Patient states during the 11/19/2024 encounter PCP advised him to increase the ozempic to 2 mg once weekly. No documentation of this found in patients chart.    Patient states since his appointment he has been giving himself two of the 1 mg injections once weekly.    Pharmacy confirmed. Please advise.

## 2025-01-15 DIAGNOSIS — Z79.4 TYPE 2 DIABETES MELLITUS WITH DIABETIC POLYNEUROPATHY, WITH LONG-TERM CURRENT USE OF INSULIN (HCC): ICD-10-CM

## 2025-01-15 DIAGNOSIS — E11.42 TYPE 2 DIABETES MELLITUS WITH DIABETIC POLYNEUROPATHY, WITH LONG-TERM CURRENT USE OF INSULIN (HCC): ICD-10-CM

## 2025-01-15 RX ORDER — SEMAGLUTIDE 1.34 MG/ML
1 INJECTION, SOLUTION SUBCUTANEOUS
Qty: 9 ML | Refills: 1 | Status: SHIPPED | OUTPATIENT
Start: 2025-01-15

## 2025-02-12 ENCOUNTER — TELEPHONE (OUTPATIENT)
Dept: PRIMARY CARE CLINIC | Age: 70
End: 2025-02-12

## 2025-02-12 DIAGNOSIS — E11.42 TYPE 2 DIABETES MELLITUS WITH DIABETIC POLYNEUROPATHY, WITH LONG-TERM CURRENT USE OF INSULIN (HCC): Primary | ICD-10-CM

## 2025-02-12 DIAGNOSIS — Z79.4 TYPE 2 DIABETES MELLITUS WITH DIABETIC POLYNEUROPATHY, WITH LONG-TERM CURRENT USE OF INSULIN (HCC): Primary | ICD-10-CM

## 2025-02-12 RX ORDER — LANCETS 30 GAUGE
EACH MISCELLANEOUS
Qty: 200 EACH | Refills: 1 | Status: SHIPPED | OUTPATIENT
Start: 2025-02-12

## 2025-02-12 RX ORDER — LANCING DEVICE/LANCETS
1 KIT MISCELLANEOUS 2 TIMES DAILY
Qty: 1 EACH | Refills: 0 | Status: SHIPPED | OUTPATIENT
Start: 2025-02-12

## 2025-02-12 NOTE — TELEPHONE ENCOUNTER
Patient is requesting a new script for a lancing devise and lancets, generic if okay with PCP.    Pharmacy confirmed. Please advise.

## 2025-02-19 DIAGNOSIS — I10 ESSENTIAL HYPERTENSION: ICD-10-CM

## 2025-02-19 DIAGNOSIS — Z79.4 TYPE 2 DIABETES MELLITUS WITH DIABETIC POLYNEUROPATHY, WITH LONG-TERM CURRENT USE OF INSULIN (HCC): ICD-10-CM

## 2025-02-19 DIAGNOSIS — E11.42 TYPE 2 DIABETES MELLITUS WITH DIABETIC POLYNEUROPATHY, WITH LONG-TERM CURRENT USE OF INSULIN (HCC): ICD-10-CM

## 2025-02-19 RX ORDER — IRBESARTAN 150 MG/1
150 TABLET ORAL NIGHTLY
Qty: 90 TABLET | Refills: 3 | Status: SHIPPED | OUTPATIENT
Start: 2025-02-19

## 2025-02-28 SDOH — ECONOMIC STABILITY: FOOD INSECURITY: WITHIN THE PAST 12 MONTHS, YOU WORRIED THAT YOUR FOOD WOULD RUN OUT BEFORE YOU GOT MONEY TO BUY MORE.: NEVER TRUE

## 2025-02-28 SDOH — ECONOMIC STABILITY: INCOME INSECURITY: IN THE LAST 12 MONTHS, WAS THERE A TIME WHEN YOU WERE NOT ABLE TO PAY THE MORTGAGE OR RENT ON TIME?: NO

## 2025-02-28 SDOH — HEALTH STABILITY: PHYSICAL HEALTH: ON AVERAGE, HOW MANY MINUTES DO YOU ENGAGE IN EXERCISE AT THIS LEVEL?: 30 MIN

## 2025-02-28 SDOH — ECONOMIC STABILITY: FOOD INSECURITY: WITHIN THE PAST 12 MONTHS, THE FOOD YOU BOUGHT JUST DIDN'T LAST AND YOU DIDN'T HAVE MONEY TO GET MORE.: NEVER TRUE

## 2025-02-28 SDOH — ECONOMIC STABILITY: TRANSPORTATION INSECURITY
IN THE PAST 12 MONTHS, HAS THE LACK OF TRANSPORTATION KEPT YOU FROM MEDICAL APPOINTMENTS OR FROM GETTING MEDICATIONS?: NO

## 2025-02-28 SDOH — HEALTH STABILITY: PHYSICAL HEALTH: ON AVERAGE, HOW MANY DAYS PER WEEK DO YOU ENGAGE IN MODERATE TO STRENUOUS EXERCISE (LIKE A BRISK WALK)?: 2 DAYS

## 2025-02-28 ASSESSMENT — PATIENT HEALTH QUESTIONNAIRE - PHQ9
2. FEELING DOWN, DEPRESSED OR HOPELESS: NOT AT ALL
SUM OF ALL RESPONSES TO PHQ QUESTIONS 1-9: 0
1. LITTLE INTEREST OR PLEASURE IN DOING THINGS: NOT AT ALL
SUM OF ALL RESPONSES TO PHQ QUESTIONS 1-9: 0

## 2025-02-28 ASSESSMENT — LIFESTYLE VARIABLES
HOW MANY STANDARD DRINKS CONTAINING ALCOHOL DO YOU HAVE ON A TYPICAL DAY: 1 OR 2
HOW OFTEN DO YOU HAVE SIX OR MORE DRINKS ON ONE OCCASION: 1
HOW MANY STANDARD DRINKS CONTAINING ALCOHOL DO YOU HAVE ON A TYPICAL DAY: 1
HOW OFTEN DO YOU HAVE A DRINK CONTAINING ALCOHOL: 2
HOW OFTEN DO YOU HAVE A DRINK CONTAINING ALCOHOL: MONTHLY OR LESS

## 2025-03-03 ENCOUNTER — OFFICE VISIT (OUTPATIENT)
Dept: PRIMARY CARE CLINIC | Age: 70
End: 2025-03-03

## 2025-03-03 VITALS
BODY MASS INDEX: 38.08 KG/M2 | HEIGHT: 71 IN | WEIGHT: 272 LBS | DIASTOLIC BLOOD PRESSURE: 82 MMHG | HEART RATE: 112 BPM | SYSTOLIC BLOOD PRESSURE: 126 MMHG | OXYGEN SATURATION: 96 %

## 2025-03-03 DIAGNOSIS — R26.89 BALANCE PROBLEMS: ICD-10-CM

## 2025-03-03 DIAGNOSIS — R39.12 BENIGN PROSTATIC HYPERPLASIA WITH WEAK URINARY STREAM: ICD-10-CM

## 2025-03-03 DIAGNOSIS — N40.1 BENIGN PROSTATIC HYPERPLASIA WITH WEAK URINARY STREAM: ICD-10-CM

## 2025-03-03 DIAGNOSIS — Z79.4 TYPE 2 DIABETES MELLITUS WITH DIABETIC POLYNEUROPATHY, WITH LONG-TERM CURRENT USE OF INSULIN (HCC): ICD-10-CM

## 2025-03-03 DIAGNOSIS — E11.42 DIABETIC PERIPHERAL NEUROPATHY (HCC): ICD-10-CM

## 2025-03-03 DIAGNOSIS — Z00.00 MEDICARE ANNUAL WELLNESS VISIT, SUBSEQUENT: Primary | ICD-10-CM

## 2025-03-03 DIAGNOSIS — E78.2 MIXED HYPERLIPIDEMIA: ICD-10-CM

## 2025-03-03 DIAGNOSIS — E11.49 TYPE 2 DIABETES MELLITUS WITH NEUROLOGICAL MANIFESTATION (HCC): ICD-10-CM

## 2025-03-03 DIAGNOSIS — E11.42 TYPE 2 DIABETES MELLITUS WITH DIABETIC POLYNEUROPATHY, WITH LONG-TERM CURRENT USE OF INSULIN (HCC): ICD-10-CM

## 2025-03-03 DIAGNOSIS — M21.372 LEFT FOOT DROP: ICD-10-CM

## 2025-03-03 PROBLEM — M21.371 FOOT DROP, RIGHT FOOT: Status: ACTIVE | Noted: 2024-06-27

## 2025-03-03 PROBLEM — K57.90 DIVERTICULOSIS: Status: ACTIVE | Noted: 2025-02-24

## 2025-03-03 LAB — HBA1C MFR BLD: 6.8 %

## 2025-03-03 RX ORDER — TAMSULOSIN HYDROCHLORIDE 0.4 MG/1
0.4 CAPSULE ORAL DAILY
Qty: 90 CAPSULE | Refills: 3 | Status: SHIPPED | OUTPATIENT
Start: 2025-03-03

## 2025-03-03 NOTE — PATIENT INSTRUCTIONS
copay.  Some of these benefits include a comprehensive review of your medical history including lifestyle, illnesses that may run in your family, and various assessments and screenings as appropriate.  After reviewing your medical record and screening and assessments performed today your provider may have ordered immunizations, labs, imaging, and/or referrals for you.  A list of these orders (if applicable) as well as your Preventive Care list are included within your After Visit Summary for your review.

## 2025-03-03 NOTE — PROGRESS NOTES
Medicare Annual Wellness Visit    Danilo YUAN Joann is here for Medicare AWV, Diabetes, and Physical Therapy Treatment    Assessment & Plan   Medicare annual wellness visit, subsequent  Type 2 diabetes mellitus with diabetic polyneuropathy, with long-term current use of insulin (HCC)  -     POCT glycosylated hemoglobin (Hb A1C)  -      DIABETES FOOT EXAM  -     metFORMIN (GLUCOPHAGE) 500 MG tablet; TAKE 2 TABLETS BY MOUTH TWICE A DAY WITH MEALS, Disp-360 tablet, R-3Normal  -     Albumin/Creatinine Ratio, Urine; Future  Type 2 diabetes mellitus with neurological manifestation (HCC)  -     POCT glycosylated hemoglobin (Hb A1C)  -      DIABETES FOOT EXAM  -     Albumin/Creatinine Ratio, Urine; Future  Diabetic peripheral neuropathy (HCC)  -     External Referral To Physical Therapy  Benign prostatic hyperplasia with weak urinary stream  -     tamsulosin (FLOMAX) 0.4 MG capsule; Take 1 capsule by mouth daily, Disp-90 capsule, R-3Normal  Left foot drop  -     External Referral To Physical Therapy  Balance problems  -     External Referral To Physical Therapy  Mixed hyperlipidemia  -     Lipid, Fasting; Future       Follow up with podiatrist    Follow up with [facility] health referral:  [facility]  [address] [place] [address]  Phone: [phone]     1. Type 2 diabetes mellitus with diabetic polyneuropathy, with long-term current use of insulin (HCC)  -Hemoglobin A1c 6.8 today.  A little elevated from last check.  - POCT glycosylated hemoglobin (Hb A1C)  -  DIABETES FOOT EXAM  - metFORMIN (GLUCOPHAGE) 500 MG tablet; TAKE 2 TABLETS BY MOUTH TWICE A DAY WITH MEALS  Dispense: 360 tablet; Refill: 3  - Albumin/Creatinine Ratio, Urine; Future  -Continue current diabetic medication    2. Type 2 diabetes mellitus with neurological manifestation (HCC)  -Hemoglobin A1c 6.8 today.  A little elevated from last check  - POCT glycosylated hemoglobin (Hb A1C)  -  DIABETES

## 2025-03-10 DIAGNOSIS — E78.2 MIXED HYPERLIPIDEMIA: ICD-10-CM

## 2025-03-10 LAB
CHOLESTEROL, FASTING: 151
CREATININE URINE: 34.52 MG/DL
HDLC SERPL-MCNC: 47 MG/DL (ref 35–70)
LDL CHOLESTEROL: 52
MICROALBUMIN/CREAT 24H UR: <0.7 MG/DL
MICROALBUMIN/CREAT UR-RTO: NORMAL
TRIGLYCERIDE, FASTING: 261

## 2025-03-10 RX ORDER — SIMVASTATIN 40 MG
TABLET ORAL
Qty: 90 TABLET | Refills: 3 | Status: SHIPPED | OUTPATIENT
Start: 2025-03-10

## 2025-03-11 ENCOUNTER — RESULTS FOLLOW-UP (OUTPATIENT)
Dept: PRIMARY CARE CLINIC | Age: 70
End: 2025-03-11

## 2025-03-11 DIAGNOSIS — Z79.4 TYPE 2 DIABETES MELLITUS WITH DIABETIC POLYNEUROPATHY, WITH LONG-TERM CURRENT USE OF INSULIN (HCC): ICD-10-CM

## 2025-03-11 DIAGNOSIS — E11.49 TYPE 2 DIABETES MELLITUS WITH NEUROLOGICAL MANIFESTATION (HCC): ICD-10-CM

## 2025-03-11 DIAGNOSIS — E11.42 TYPE 2 DIABETES MELLITUS WITH DIABETIC POLYNEUROPATHY, WITH LONG-TERM CURRENT USE OF INSULIN (HCC): ICD-10-CM

## 2025-03-11 DIAGNOSIS — E78.2 MIXED HYPERLIPIDEMIA: ICD-10-CM

## 2025-04-13 DIAGNOSIS — E11.42 TYPE 2 DIABETES MELLITUS WITH DIABETIC POLYNEUROPATHY, WITH LONG-TERM CURRENT USE OF INSULIN (HCC): ICD-10-CM

## 2025-04-13 DIAGNOSIS — Z79.4 TYPE 2 DIABETES MELLITUS WITH DIABETIC POLYNEUROPATHY, WITH LONG-TERM CURRENT USE OF INSULIN (HCC): ICD-10-CM

## 2025-04-14 RX ORDER — INSULIN GLARGINE 300 U/ML
INJECTION, SOLUTION SUBCUTANEOUS
Qty: 12 ML | Refills: 2 | Status: SHIPPED | OUTPATIENT
Start: 2025-04-14

## 2025-05-01 ENCOUNTER — OFFICE VISIT (OUTPATIENT)
Dept: PRIMARY CARE CLINIC | Age: 70
End: 2025-05-01

## 2025-05-01 VITALS
BODY MASS INDEX: 37.24 KG/M2 | SYSTOLIC BLOOD PRESSURE: 118 MMHG | HEART RATE: 112 BPM | OXYGEN SATURATION: 99 % | WEIGHT: 266 LBS | HEIGHT: 71 IN | DIASTOLIC BLOOD PRESSURE: 78 MMHG

## 2025-05-01 DIAGNOSIS — Z79.4 TYPE 2 DIABETES MELLITUS WITH DIABETIC POLYNEUROPATHY, WITH LONG-TERM CURRENT USE OF INSULIN (HCC): Primary | ICD-10-CM

## 2025-05-01 DIAGNOSIS — M25.562 ACUTE PAIN OF LEFT KNEE: ICD-10-CM

## 2025-05-01 DIAGNOSIS — S46.911A STRAIN OF RIGHT SHOULDER, INITIAL ENCOUNTER: ICD-10-CM

## 2025-05-01 DIAGNOSIS — E11.42 TYPE 2 DIABETES MELLITUS WITH DIABETIC POLYNEUROPATHY, WITH LONG-TERM CURRENT USE OF INSULIN (HCC): Primary | ICD-10-CM

## 2025-05-01 LAB — HBA1C MFR BLD: 6.9 %

## 2025-05-01 RX ORDER — CYCLOBENZAPRINE HCL 5 MG
TABLET ORAL
COMMUNITY
Start: 2025-03-19

## 2025-05-01 RX ORDER — LIDOCAINE 4 G/G
1 PATCH TOPICAL NIGHTLY
Qty: 30 PATCH | Refills: 1 | Status: SHIPPED | OUTPATIENT
Start: 2025-05-01 | End: 2025-06-30

## 2025-05-01 RX ORDER — PREDNISONE 20 MG/1
TABLET ORAL
Qty: 18 TABLET | Refills: 0 | Status: SHIPPED | OUTPATIENT
Start: 2025-05-01 | End: 2025-05-11

## 2025-05-01 ASSESSMENT — ENCOUNTER SYMPTOMS
BACK PAIN: 1
CONSTIPATION: 0
SHORTNESS OF BREATH: 0
COUGH: 0
DIARRHEA: 0

## 2025-05-01 NOTE — PATIENT INSTRUCTIONS
Physical therapy referral    Prednisone taper dose    Follow up with orthopedic surgeon.    Lidocaine patch to right shoulder at night and off in the morning.    HgA1C = 6.9 today

## 2025-05-01 NOTE — PROGRESS NOTES
Relation Age of Onset    Diabetes Mother     Cancer Father        Social History     Tobacco Use    Smoking status: Never    Smokeless tobacco: Never   Substance Use Topics    Alcohol use: Yes     Alcohol/week: 1.0 standard drink of alcohol     Types: 1 Cans of beer per week      Current Outpatient Medications   Medication Sig Dispense Refill    cyclobenzaprine (FLEXERIL) 5 MG tablet TAKE 1 TABLET BY MOUTH TWICE A DAY AS NEEDED FOR MUSCLE SPASMS FOR UP TO 10 DAYS      predniSONE (DELTASONE) 20 MG tablet 3 tabs x 3 days, then 2 tabs x 3 days, then 1 tab x 3 days 18 tablet 0    metFORMIN (GLUCOPHAGE) 500 MG tablet TAKE 2 TABLETS BY MOUTH TWICE A DAY WITH MEALS 360 tablet 3    lidocaine 4 % external patch Place 1 patch onto the skin at bedtime 30 patch 1    TOUJEO MAX SOLOSTAR 300 UNIT/ML concentrated injection pen INJECT 30 UNITS            SUBCUTANEOUSLY DAILY 12 mL 2    simvastatin (ZOCOR) 40 MG tablet TAKE 1 TABLET NIGHTLY 90 tablet 3    mycophenolate (CELLCEPT) 500 MG tablet Take 1 tablet by mouth 2 times daily      inFLIXimab (REMICADE) 100 MG injection Infuse 1 mL intravenously every 30 days      tamsulosin (FLOMAX) 0.4 MG capsule Take 1 capsule by mouth daily 90 capsule 3    irbesartan (AVAPRO) 150 MG tablet TAKE 1 TABLET BY MOUTH EVERY DAY AT NIGHT 90 tablet 3    Lancets MISC Test 2x times daily. Brand per insurance preference. 200 each 1    Lancet Devices ([employer] LANCING DEVICE) MISC 1 Device by Does not apply route 2 times daily 1 each 0    blood glucose test strips (ASCENSIA AUTODISC VI;ONE TOUCH ULTRA TEST VI) strip Use with associated glucose meter. Use 2x day 100 strip 5    Semaglutide, 1 MG/DOSE, (OZEMPIC, 1 MG/DOSE,) 4 MG/3ML SOPN sc injection Inject 1 mg into the skin every 7 days 9 mL 1    folic acid (FOLVITE) 1 MG tablet Take 1 tablet by mouth in the morning and at bedtime      torsemide (DEMADEX) 20 MG tablet TAKE 1 TABLET TWICE A  tablet 3    diclofenac sodium (VOLTAREN) 1 % GEL Apply 2 g

## 2025-05-08 ENCOUNTER — TELEPHONE (OUTPATIENT)
Dept: PRIMARY CARE CLINIC | Age: 70
End: 2025-05-08

## 2025-05-08 DIAGNOSIS — S46.911A STRAIN OF RIGHT SHOULDER, INITIAL ENCOUNTER: Primary | ICD-10-CM

## 2025-05-08 NOTE — TELEPHONE ENCOUNTER
Referral entered to:  [facility]  Address: [address] [place] [address]  Phone: [phone]    Please send referral and update patient.

## 2025-05-08 NOTE — TELEPHONE ENCOUNTER
Patient called in stating was evaluated by physical therapy he was referred to and they stated patients right shoulder needs manipulation. States the rt hand is also staying very numb. States he use to see [facility] and they do Chiropractic there. Wants to know if ok for a referral there or what you advise?    -Please advise

## 2025-05-29 DIAGNOSIS — E11.42 TYPE 2 DIABETES MELLITUS WITH DIABETIC POLYNEUROPATHY, WITH LONG-TERM CURRENT USE OF INSULIN (HCC): ICD-10-CM

## 2025-05-29 DIAGNOSIS — Z79.4 TYPE 2 DIABETES MELLITUS WITH DIABETIC POLYNEUROPATHY, WITH LONG-TERM CURRENT USE OF INSULIN (HCC): ICD-10-CM

## 2025-05-29 RX ORDER — OMEPRAZOLE 40 MG/1
40 CAPSULE, DELAYED RELEASE ORAL DAILY
Qty: 90 CAPSULE | Refills: 3 | Status: SHIPPED | OUTPATIENT
Start: 2025-05-29

## 2025-05-30 RX ORDER — SEMAGLUTIDE 1.34 MG/ML
INJECTION, SOLUTION SUBCUTANEOUS
Qty: 9 ML | Refills: 1 | Status: SHIPPED | OUTPATIENT
Start: 2025-05-30

## 2025-06-03 DIAGNOSIS — Z79.4 TYPE 2 DIABETES MELLITUS WITH DIABETIC POLYNEUROPATHY, WITH LONG-TERM CURRENT USE OF INSULIN (HCC): ICD-10-CM

## 2025-06-03 DIAGNOSIS — N40.1 BENIGN PROSTATIC HYPERPLASIA WITH WEAK URINARY STREAM: ICD-10-CM

## 2025-06-03 DIAGNOSIS — E11.42 TYPE 2 DIABETES MELLITUS WITH DIABETIC POLYNEUROPATHY, WITH LONG-TERM CURRENT USE OF INSULIN (HCC): ICD-10-CM

## 2025-06-03 DIAGNOSIS — R39.12 BENIGN PROSTATIC HYPERPLASIA WITH WEAK URINARY STREAM: ICD-10-CM

## 2025-06-03 RX ORDER — PEN NEEDLE, DIABETIC 32 GX 1/4"
NEEDLE, DISPOSABLE MISCELLANEOUS
Qty: 100 EACH | Refills: 2 | Status: SHIPPED | OUTPATIENT
Start: 2025-06-03

## 2025-06-03 RX ORDER — FINASTERIDE 5 MG/1
5 TABLET, FILM COATED ORAL DAILY
Qty: 90 TABLET | Refills: 1 | Status: SHIPPED | OUTPATIENT
Start: 2025-06-03

## 2025-07-18 ENCOUNTER — TELEPHONE (OUTPATIENT)
Dept: PRIMARY CARE CLINIC | Age: 70
End: 2025-07-18

## 2025-07-18 NOTE — TELEPHONE ENCOUNTER
Patient called in stating that he had blood in his stool last night    Patient had gotten an infusion yesterday for Sarcoidosis - states this has never happened before, and not sure if it's related.    Will schedule an appointment if needed.    Please advise.

## 2025-07-21 ENCOUNTER — TELEPHONE (OUTPATIENT)
Dept: PRIMARY CARE CLINIC | Age: 70
End: 2025-07-21

## 2025-07-21 NOTE — TELEPHONE ENCOUNTER
Call placed to patient.  Patient requests to cancel appt with Dr. Carter on 7/25/25 and keep appt with Onur in August.    ----- Message from Dennise HANSEN sent at 7/18/2025  4:33 PM EDT -----  Regarding: ECC Message to Provider  ECC Message to Provider    Relationship to Patient: Self     Additional Information /the patient want to confirm why is it the appt with Mg Carter MD and not with the primary care provider Onur Santo, KADIE - CNP      --------------------------------------------------------------------------------------------------------------------------    Call Back Information: OK to leave message on voicemail  Preferred Call Back Number: Phone  [phone]

## 2025-08-18 ENCOUNTER — OFFICE VISIT (OUTPATIENT)
Dept: PRIMARY CARE CLINIC | Age: 70
End: 2025-08-18
Payer: MEDICARE

## 2025-08-18 ENCOUNTER — TELEPHONE (OUTPATIENT)
Dept: PRIMARY CARE CLINIC | Age: 70
End: 2025-08-18

## 2025-08-18 VITALS
DIASTOLIC BLOOD PRESSURE: 78 MMHG | HEIGHT: 71 IN | WEIGHT: 267.3 LBS | HEART RATE: 111 BPM | SYSTOLIC BLOOD PRESSURE: 122 MMHG | OXYGEN SATURATION: 97 % | BODY MASS INDEX: 37.42 KG/M2

## 2025-08-18 DIAGNOSIS — M21.372 LEFT FOOT DROP: Primary | ICD-10-CM

## 2025-08-18 DIAGNOSIS — R60.0 LOCALIZED EDEMA: ICD-10-CM

## 2025-08-18 DIAGNOSIS — E11.42 TYPE 2 DIABETES MELLITUS WITH DIABETIC POLYNEUROPATHY, WITH LONG-TERM CURRENT USE OF INSULIN (HCC): ICD-10-CM

## 2025-08-18 DIAGNOSIS — E11.42 DIABETIC PERIPHERAL NEUROPATHY (HCC): ICD-10-CM

## 2025-08-18 DIAGNOSIS — Z79.4 TYPE 2 DIABETES MELLITUS WITH DIABETIC POLYNEUROPATHY, WITH LONG-TERM CURRENT USE OF INSULIN (HCC): ICD-10-CM

## 2025-08-18 DIAGNOSIS — S81.802A WOUND OF LEFT LOWER EXTREMITY, INITIAL ENCOUNTER: ICD-10-CM

## 2025-08-18 PROBLEM — E66.812 CLASS 2 OBESITY: Status: ACTIVE | Noted: 2025-08-18

## 2025-08-18 PROBLEM — Z47.32 ENCOUNTER FOR INSERTION OF PROSTHETIC HIP AFTER PRIOR REMOVAL OF HIP PROSTHESIS: Status: ACTIVE | Noted: 2021-11-24

## 2025-08-18 PROBLEM — E11.9 TYPE 2 DIABETES MELLITUS WITH INSULIN THERAPY (HCC): Status: ACTIVE | Noted: 2025-07-16

## 2025-08-18 PROCEDURE — 1160F RVW MEDS BY RX/DR IN RCRD: CPT | Performed by: NURSE PRACTITIONER

## 2025-08-18 PROCEDURE — 3078F DIAST BP <80 MM HG: CPT | Performed by: NURSE PRACTITIONER

## 2025-08-18 PROCEDURE — 99214 OFFICE O/P EST MOD 30 MIN: CPT | Performed by: NURSE PRACTITIONER

## 2025-08-18 PROCEDURE — 3074F SYST BP LT 130 MM HG: CPT | Performed by: NURSE PRACTITIONER

## 2025-08-18 PROCEDURE — 1123F ACP DISCUSS/DSCN MKR DOCD: CPT | Performed by: NURSE PRACTITIONER

## 2025-08-18 PROCEDURE — 1159F MED LIST DOCD IN RCRD: CPT | Performed by: NURSE PRACTITIONER

## 2025-08-18 PROCEDURE — 3044F HG A1C LEVEL LT 7.0%: CPT | Performed by: NURSE PRACTITIONER

## 2025-08-18 RX ORDER — KETOROLAC TROMETHAMINE 30 MG/ML
INJECTION, SOLUTION INTRAMUSCULAR; INTRAVENOUS
COMMUNITY
Start: 2025-07-16

## 2025-08-18 RX ORDER — HYDROCHLOROTHIAZIDE 12.5 MG/1
CAPSULE ORAL
COMMUNITY
Start: 2025-07-16

## 2025-08-18 RX ORDER — TORSEMIDE 20 MG/1
20 TABLET ORAL 2 TIMES DAILY
Qty: 180 TABLET | Refills: 2 | Status: SHIPPED | OUTPATIENT
Start: 2025-08-18